# Patient Record
Sex: FEMALE | Race: WHITE | NOT HISPANIC OR LATINO | URBAN - METROPOLITAN AREA
[De-identification: names, ages, dates, MRNs, and addresses within clinical notes are randomized per-mention and may not be internally consistent; named-entity substitution may affect disease eponyms.]

---

## 2024-05-21 ENCOUNTER — INPATIENT (INPATIENT)
Facility: HOSPITAL | Age: 59
LOS: 2 days | Discharge: HOME | DRG: 92 | End: 2024-05-24
Attending: HOSPITALIST | Admitting: HOSPITALIST
Payer: SELF-PAY

## 2024-05-21 VITALS
WEIGHT: 139.99 LBS | SYSTOLIC BLOOD PRESSURE: 166 MMHG | HEART RATE: 105 BPM | OXYGEN SATURATION: 99 % | DIASTOLIC BLOOD PRESSURE: 76 MMHG | HEIGHT: 68 IN | RESPIRATION RATE: 17 BRPM

## 2024-05-21 DIAGNOSIS — Z90.13 ACQUIRED ABSENCE OF BILATERAL BREASTS AND NIPPLES: Chronic | ICD-10-CM

## 2024-05-21 DIAGNOSIS — G93.40 ENCEPHALOPATHY, UNSPECIFIED: ICD-10-CM

## 2024-05-21 DIAGNOSIS — Z29.9 ENCOUNTER FOR PROPHYLACTIC MEASURES, UNSPECIFIED: ICD-10-CM

## 2024-05-21 DIAGNOSIS — R41.82 ALTERED MENTAL STATUS, UNSPECIFIED: ICD-10-CM

## 2024-05-21 DIAGNOSIS — Z98.1 ARTHRODESIS STATUS: Chronic | ICD-10-CM

## 2024-05-21 DIAGNOSIS — Z79.899 OTHER LONG TERM (CURRENT) DRUG THERAPY: ICD-10-CM

## 2024-05-21 LAB
ADD ON TEST-SPECIMEN IN LAB: SIGNIFICANT CHANGE UP
ALBUMIN SERPL ELPH-MCNC: 4.5 G/DL — SIGNIFICANT CHANGE UP (ref 3.3–5)
ALP SERPL-CCNC: 86 U/L — SIGNIFICANT CHANGE UP (ref 40–120)
ALT FLD-CCNC: 36 U/L — SIGNIFICANT CHANGE UP (ref 10–45)
AMPHET UR-MCNC: NEGATIVE — SIGNIFICANT CHANGE UP
ANION GAP SERPL CALC-SCNC: 15 MMOL/L — SIGNIFICANT CHANGE UP (ref 5–17)
APAP SERPL-MCNC: <15 UG/ML — SIGNIFICANT CHANGE UP (ref 10–30)
AST SERPL-CCNC: 42 U/L — HIGH (ref 10–40)
BARBITURATES UR SCN-MCNC: NEGATIVE — SIGNIFICANT CHANGE UP
BASOPHILS # BLD AUTO: 0.05 K/UL — SIGNIFICANT CHANGE UP (ref 0–0.2)
BASOPHILS NFR BLD AUTO: 0.8 % — SIGNIFICANT CHANGE UP (ref 0–2)
BENZODIAZ UR-MCNC: NEGATIVE — SIGNIFICANT CHANGE UP
BILIRUB SERPL-MCNC: 0.4 MG/DL — SIGNIFICANT CHANGE UP (ref 0.2–1.2)
BUN SERPL-MCNC: 21 MG/DL — SIGNIFICANT CHANGE UP (ref 7–23)
CALCIUM SERPL-MCNC: 9.7 MG/DL — SIGNIFICANT CHANGE UP (ref 8.4–10.5)
CHLORIDE SERPL-SCNC: 102 MMOL/L — SIGNIFICANT CHANGE UP (ref 96–108)
CO2 SERPL-SCNC: 22 MMOL/L — SIGNIFICANT CHANGE UP (ref 22–31)
COCAINE METAB.OTHER UR-MCNC: NEGATIVE — SIGNIFICANT CHANGE UP
CREAT SERPL-MCNC: 0.71 MG/DL — SIGNIFICANT CHANGE UP (ref 0.5–1.3)
EGFR: 99 ML/MIN/1.73M2 — SIGNIFICANT CHANGE UP
EOSINOPHIL # BLD AUTO: 0.08 K/UL — SIGNIFICANT CHANGE UP (ref 0–0.5)
EOSINOPHIL NFR BLD AUTO: 1.2 % — SIGNIFICANT CHANGE UP (ref 0–6)
ETHANOL SERPL-MCNC: <10 MG/DL — SIGNIFICANT CHANGE UP (ref 0–10)
FLUAV AG NPH QL: SIGNIFICANT CHANGE UP
FLUBV AG NPH QL: SIGNIFICANT CHANGE UP
GLUCOSE SERPL-MCNC: 114 MG/DL — HIGH (ref 70–99)
HCG SERPL-ACNC: <2 MIU/ML — SIGNIFICANT CHANGE UP
HCT VFR BLD CALC: 40.9 % — SIGNIFICANT CHANGE UP (ref 34.5–45)
HGB BLD-MCNC: 14 G/DL — SIGNIFICANT CHANGE UP (ref 11.5–15.5)
IMM GRANULOCYTES NFR BLD AUTO: 0.3 % — SIGNIFICANT CHANGE UP (ref 0–0.9)
LYMPHOCYTES # BLD AUTO: 1.39 K/UL — SIGNIFICANT CHANGE UP (ref 1–3.3)
LYMPHOCYTES # BLD AUTO: 21.2 % — SIGNIFICANT CHANGE UP (ref 13–44)
MCHC RBC-ENTMCNC: 30.9 PG — SIGNIFICANT CHANGE UP (ref 27–34)
MCHC RBC-ENTMCNC: 34.2 GM/DL — SIGNIFICANT CHANGE UP (ref 32–36)
MCV RBC AUTO: 90.3 FL — SIGNIFICANT CHANGE UP (ref 80–100)
METHADONE UR-MCNC: NEGATIVE — SIGNIFICANT CHANGE UP
MONOCYTES # BLD AUTO: 0.88 K/UL — SIGNIFICANT CHANGE UP (ref 0–0.9)
MONOCYTES NFR BLD AUTO: 13.4 % — SIGNIFICANT CHANGE UP (ref 2–14)
NEUTROPHILS # BLD AUTO: 4.13 K/UL — SIGNIFICANT CHANGE UP (ref 1.8–7.4)
NEUTROPHILS NFR BLD AUTO: 63.1 % — SIGNIFICANT CHANGE UP (ref 43–77)
NRBC # BLD: 0 /100 WBCS — SIGNIFICANT CHANGE UP (ref 0–0)
OPIATES UR-MCNC: POSITIVE
OXYCODONE UR-MCNC: NEGATIVE — SIGNIFICANT CHANGE UP
PCP SPEC-MCNC: SIGNIFICANT CHANGE UP
PCP UR-MCNC: NEGATIVE — SIGNIFICANT CHANGE UP
PLATELET # BLD AUTO: 286 K/UL — SIGNIFICANT CHANGE UP (ref 150–400)
POTASSIUM SERPL-MCNC: 3.5 MMOL/L — SIGNIFICANT CHANGE UP (ref 3.5–5.3)
POTASSIUM SERPL-SCNC: 3.5 MMOL/L — SIGNIFICANT CHANGE UP (ref 3.5–5.3)
PROT SERPL-MCNC: 7.4 G/DL — SIGNIFICANT CHANGE UP (ref 6–8.3)
RBC # BLD: 4.53 M/UL — SIGNIFICANT CHANGE UP (ref 3.8–5.2)
RBC # FLD: 12.4 % — SIGNIFICANT CHANGE UP (ref 10.3–14.5)
RSV RNA NPH QL NAA+NON-PROBE: SIGNIFICANT CHANGE UP
SALICYLATES SERPL-MCNC: <2 MG/DL — LOW (ref 15–30)
SARS-COV-2 RNA SPEC QL NAA+PROBE: SIGNIFICANT CHANGE UP
SODIUM SERPL-SCNC: 139 MMOL/L — SIGNIFICANT CHANGE UP (ref 135–145)
THC UR QL: NEGATIVE — SIGNIFICANT CHANGE UP
WBC # BLD: 6.55 K/UL — SIGNIFICANT CHANGE UP (ref 3.8–10.5)
WBC # FLD AUTO: 6.55 K/UL — SIGNIFICANT CHANGE UP (ref 3.8–10.5)

## 2024-05-21 PROCEDURE — 99285 EMERGENCY DEPT VISIT HI MDM: CPT

## 2024-05-21 PROCEDURE — 70450 CT HEAD/BRAIN W/O DYE: CPT | Mod: 26,MC

## 2024-05-21 PROCEDURE — 72125 CT NECK SPINE W/O DYE: CPT | Mod: 26,MC

## 2024-05-21 PROCEDURE — 99451 NTRPROF PH1/NTRNET/EHR 5/>: CPT

## 2024-05-21 PROCEDURE — 99223 1ST HOSP IP/OBS HIGH 75: CPT | Mod: GC

## 2024-05-21 RX ORDER — HEPARIN SODIUM 5000 [USP'U]/ML
5000 INJECTION INTRAVENOUS; SUBCUTANEOUS EVERY 12 HOURS
Refills: 0 | Status: DISCONTINUED | OUTPATIENT
Start: 2024-05-21 | End: 2024-05-24

## 2024-05-21 RX ORDER — THIAMINE MONONITRATE (VIT B1) 100 MG
500 TABLET ORAL THREE TIMES A DAY
Refills: 0 | Status: DISCONTINUED | OUTPATIENT
Start: 2024-05-21 | End: 2024-05-22

## 2024-05-21 RX ORDER — FOLIC ACID 0.8 MG
1 TABLET ORAL DAILY
Refills: 0 | Status: DISCONTINUED | OUTPATIENT
Start: 2024-05-21 | End: 2024-05-24

## 2024-05-21 RX ORDER — SODIUM CHLORIDE 9 MG/ML
1000 INJECTION, SOLUTION INTRAVENOUS ONCE
Refills: 0 | Status: COMPLETED | OUTPATIENT
Start: 2024-05-21 | End: 2024-05-21

## 2024-05-21 RX ORDER — DIAZEPAM 5 MG
5 TABLET ORAL ONCE
Refills: 0 | Status: DISCONTINUED | OUTPATIENT
Start: 2024-05-21 | End: 2024-05-21

## 2024-05-21 RX ADMIN — Medication 5 MILLIGRAM(S): at 14:35

## 2024-05-21 RX ADMIN — Medication 2 MILLIGRAM(S): at 23:52

## 2024-05-21 RX ADMIN — SODIUM CHLORIDE 1000 MILLILITER(S): 9 INJECTION, SOLUTION INTRAVENOUS at 23:47

## 2024-05-21 NOTE — CONSULT NOTE ADULT - PROBLEM SELECTOR RECOMMENDATION 9
Emergency Medicine / Medical Toxicology Attending MD Gonsalves:    58-year-old female who presented to the ED with altered mental status of unclear etiology.  Medical history significant for breast cancer, lumbar stenosis, GERD.  Similar admission in 2022 for altered mental status with potential hallucinations.  Patient has a medical history of binge drinking, last EtOH 48 hours ago, EtOH at the time of ER presentation undetectable.    Patient is afebrile, no leukocytosis, complete chemistry and metabolic unremarkable, urine drug screen is only positive for opiates, CT head and C-spine unremarkable, twelve-lead ECG directly visualized by me and shows normal sinus rhythm, rate 96, , QRS 90, QTc 492, no ST elevations or depressions.    Patient has required periodic medication for agitation, with lorazepam and haloperidol boluses.    Recommendations:  -Agree with thiamine 500 mg 3 times daily as this patient's altered mental status may be Warnicke encephalopathy.  -If patient's altered mental status is due to polypharmacy intoxication then her altered mental status should improve over the next 12 to 36 hours.  -Patient is at risk for alcohol withdrawal given her recent 15-day binge, and agree with [facility] protocol.  -Continue to search for alternative etiology of patient's altered mental status.

## 2024-05-21 NOTE — ED PROVIDER NOTE - CLINICAL SUMMARY MEDICAL DECISION MAKING FREE TEXT BOX
Patient presents emergency department with altered mental status.  Patient is hemodynamically stable afebrile on presentation.  Patient physical exam limited due to altered mental status.  Patient is hallucinating and talking to herself.  Collateral obtained from son at this time there is no acute concern for alcohol withdrawal however given patient's presentation and hallucinations MICU has been consulted for DTs.    Evaluated patient's chart in different MRN which shows that the patient had a similar presentation to [facility] emergency department in the past.  No diagnosis was made at that time.  Will obtain lab work and imaging to evaluate for acute pathologies..

## 2024-05-21 NOTE — H&P ADULT - NSHPSOCIALHISTORY_GEN_ALL_CORE
Per son Asher, lives with 2 sons, though they are intermittently out of the house. Works medical billing for ex-. Former smoker, smoked a lot from [age]

## 2024-05-21 NOTE — H&P ADULT - ATTENDING COMMENTS
generalized weakness and nausea 58F with PMH R breast cancer (s/p double mastectomy (2017), RT) lumbar stenosis s/p L2-3 fusion, GERD, and glaucoma presenting after being found down outside with altered mental status,     # Acute toxic metabolic encephalopathy - Pt currently appears to be responding to internal stimuli (hallucinations?) Pt's responses are nonsensical and unable to provide history though she seems to know her name and location. pt has been binge drinking recently and her current symptoms may be possible  sequelae. Also possible there is some other toxic ingestion or a primary psychiatric illness. She has had a previous episode where she quickly returned to baseline however she continues to be altered at this time. No significant electrolyte abnormalities. Utox significant for opiates, neg for oxycodone. F/u toxicology recs. Psych evaluation.    # Alcohol abuse - recent binge drinking and alcohol abuse. Will monitor for alcohol withdrawal symptoms. Start CIWA, symptom triggered ativan. Ordered MV, folate, thiamine.     Rest of plan as outline above. Discussed case with resident 58F with PMH R breast cancer (s/p double mastectomy (2017), RT) lumbar stenosis s/p L2-3 fusion, GERD, and glaucoma presenting after being found down outside with altered mental status,     # Acute toxic metabolic encephalopathy - Pt currently appears to be responding to internal stimuli (hallucinations?) Pt's responses are nonsensical and unable to provide history though she seems to know her name and location. pt has been binge drinking recently and her current symptoms may be possible  sequelae. Also possible there is some other toxic ingestion or a primary psychiatric illness. She has had a previous episode where she quickly returned to baseline however she continues to be altered/disorganized at this time. No significant electrolyte abnormalities. Utox significant for opiates, neg for oxycodone. F/u toxicology recs. Psych evaluation.    # Alcohol abuse - recent binge drinking and alcohol abuse. Will monitor for alcohol withdrawal symptoms. Start CIWA, symptom triggered ativan. Ordered MV, folate, thiamine.     Rest of plan as outline above. Discussed case with resident

## 2024-05-21 NOTE — H&P ADULT - NSHPPHYSICALEXAM_GEN_ALL_CORE
VITALS:   T(C): 36.6 (05-21-24 @ 16:48), Max: 36.6 (05-21-24 @ 16:48)  HR: 90 (05-21-24 @ 16:48) (90 - 105)  BP: 164/93 (05-21-24 @ 16:48) (164/93 - 166/76)  RR: 16 (05-21-24 @ 16:48) (16 - 17)  SpO2: 100% (05-21-24 @ 16:48) (99% - 100%)    GENERAL: NAD, responding to internal stimuli  HEAD: Atraumatic, Normocephalic  EYES: EOMI, PERRLA, conjunctiva and sclera clear  ENT: Moist mucous membranes  NECK: Supple, No JVD  CHEST/LUNG: Clear to auscultation bilaterally; no crackles or wheezing  HEART: Regular rate and rhythm; No murmurs.  Peripheral edema:none  ABDOMEN: Soft, nontender, nondistended  EXTREMITIES:  2+ radial pulses  NERVOUS SYSTEM: Unable to assess AO status, no gross lateralizing deficits   SKIN: No rashes or lesions VITALS:   T(C): 36.6 (05-21-24 @ 16:48), Max: 36.6 (05-21-24 @ 16:48)  HR: 90 (05-21-24 @ 16:48) (90 - 105)  BP: 164/93 (05-21-24 @ 16:48) (164/93 - 166/76)  RR: 16 (05-21-24 @ 16:48) (16 - 17)  SpO2: 100% (05-21-24 @ 16:48) (99% - 100%)    GENERAL: NAD, responding to internal stimuli  HEAD: Atraumatic, Normocephalic  EYES: EOMI, PERRLA, conjunctiva and sclera clear  ENT: Moist mucous membranes  NECK: Supple, No JVD  CHEST/LUNG: Clear to auscultation bilaterally; no crackles or wheezing  HEART: Regular rate and rhythm; No murmurs.  Peripheral edema: none  ABDOMEN: Soft, nontender, nondistended  EXTREMITIES:  2+ radial pulses  NERVOUS SYSTEM: Unable to assess AO status, no gross lateralizing deficits   SKIN: No rashes or lesions

## 2024-05-21 NOTE — ED PROVIDER NOTE - PROGRESS NOTE DETAILS
Had extensive conversation with Grabiel who is the patient's son and healthcare proxy.  According to the son the patient has been on a binge of alcohol for the last 15 days.  Son and other brother have been in the house for the last 48 hours and states that the patient has not had a drink in the last 48 hours.  However at around 9 AM the patient ran out of the house unbeknownst to sons.  They were unable to find her and are not sure if she had anything to drink since then.  Denies any alcohol use or drug use in the last 48 hours according to the sons.  States that the patient has been going through a lot of stressors at home. Patient signed out to me by the prior attending.  The patient's disposition was discussed with the treating team and agreed upon.  Escobar Crandall M.D. (attending) Patient with history of substance abuse, psych hx, patient found face down in a baseball field today, patient is reportedly wacky chatting with her self today in monologue, presumed delirious talking to self, patient reportedly binge drank for 2 weeks and now 'dry for 72'. Patient without signs of motor excitation or fasciculations, valium 5mg given here by previous team. Spoke with psych who will come see the patient.

## 2024-05-21 NOTE — CONSULT NOTE ADULT - ATTENDING COMMENTS
Agree with above  57F PMH breast ca (s/p double mastectomy s/p RT) found down with AH/VH with possible binge drinking. MICU consult for possible DTs / etOH withdrawal.  - UTox negative for etOH, rest of tox panel pending.  - Patient is awake and responsive, but completely nonsensical on exam, in a way that almost appears to be expressive aphasia. Pupils are dilated. ?sympathomimetic ?anticholinergic toxidrome.  - Saturating well, protecting airway, hemodynamically stable  - No need for ICU level of care at this time. Please feel free to re-consult if patient's condition worsens.

## 2024-05-21 NOTE — CONSULT NOTE ADULT - ASSESSMENT
Toxicologic Context  Ethanol and sedative hypnotic withdrawal can present similarly with autonomic stimulation (tachycardia, hypertension, hyperthermia) and neuromuscular excitation (diaphoresis, tremors, rigidity, hypertonia, seizures, hallucinations), and psychological changes (insomnia, anxiety, agitation, emotional lability). Withdrawal can occur as early as 6 hours since cessation of substance. Other toxicological differentials to consider include sympathomimetic toxicity, serotonin toxicity, neuroleptic malignant syndrome, alpha-2-agonist withdrawal, and malginant hyperthermia.    Recommendations  - Supportive care, continue to monitor for development of alcohol withdrawal as patient is at high risk given recent 15 day binge   - Initiate CIWA protocol  - Benzodiazepines as needed for agitation, delirium, or seizures  - Further medical and/or psychiatric care per primary team    Thank you for involving us in the care of this patient. Assessment and plan discussed with toxicology attending Dr. José Luis Gonsalves. Please do not hesitate to reach out to the toxicology team for any further questions or concerns.    The On-Call Toxicology Fellow can be reached 24/7 via Pager #[phone]  Please send a 10 digit call back # as [place] cover multiple hospitals    Luisito Patel MD  Toxicology Fellow  PGY-4

## 2024-05-21 NOTE — ED ADULT NURSE NOTE - NSFALLRISKINTERV_ED_ALL_ED
Assistance OOB with selected safe patient handling equipment if applicable/Assistance with ambulation/Communicate fall risk and risk factors to all staff, patient, and family/Monitor gait and stability/Monitor for mental status changes and reorient to person, place, and time, as needed/Provide visual cue: yellow wristband, yellow gown, etc/Reinforce activity limits and safety measures with patient and family/Toileting schedule using arm’s reach rule for commode and bathroom/Use of alarms - bed, stretcher, chair and/or video monitoring/Call bell, personal items and telephone in reach/Instruct patient to call for assistance before getting out of bed/chair/stretcher/Non-slip footwear applied when patient is off stretcher/[place] to call system/Physically safe environment - no spills, clutter or unnecessary equipment/Purposeful Proactive Rounding/Room/bathroom lighting operational, light cord in reach

## 2024-05-21 NOTE — ED ADULT NURSE NOTE - OBJECTIVE STATEMENT
58 year old female pt presented to the ED via ems with pt being found in the street on the ground, pt presented to the ED not following commands and speaking to herself( hallucinations), MD spoke with family with son stating pt has been drinking alcohol binging and 2 days of not drinking, pt in hospital gown with no obvious signs of trauma pt moving all extremities, pt speaking to self not following commands, unable to perform CIWA at this time

## 2024-05-21 NOTE — H&P ADULT - ASSESSMENT
57F with PMH R breast cancer (s/p double mastectomy (2017), RT) lumbar stenosis s/p L2-3 fusion, GERD, and glaucoma brought in after being found down outside with reported visual/auditory hallucinations in the ED in setting of recent binge drinking. Found to have + opiates in urine. Admitted for encephalopathy evaluation.  58F with PMH R breast cancer (s/p double mastectomy (2017), RT) lumbar stenosis s/p L2-3 fusion, GERD, and glaucoma brought in after being found down outside with reported visual/auditory hallucinations in the ED in setting of recent binge drinking. Found to have + opiates in urine. Admitted for encephalopathy evaluation.

## 2024-05-21 NOTE — CONSULT NOTE ADULT - SUBJECTIVE AND OBJECTIVE BOX
CHIEF COMPLAINT: hallucinations     HPI:  57F with PMH R breast cancer (s/p double mastectomy (2017), RT) lumbar stenosis s/p L2-3 fusion, GERD, and glaucoma brought in after being found down outside near a school. Per ED, son Grabiel was previously at bedside and states that pt had been binge drinking for past 15 days. Last known drink was 48 hrs ago. However, around 9am today pt ran out of the house and found around 1pm. Without any known substance use disorder, prior hospitalizations for alcohol use.     In ED, VS afebrile, /76, , Spo2 99% on RA. Was reportedly actively hallucinating, talking to thin air.  Labs largely unrevealing. Negative alcohol level. Drug screen pending.   CT head and c-spine without acute findings   Received diazepam 5mg IVPx1 in ED.     MICU was consulted for concerns for delirium tremens.     No one at bedside upon evaluation. Patient nonsensical/not engaging in interview. Upon review of HIE, other charts available under [facility]. Pt had an admission in 2022 for similar hallucinations/AMS for which  was following. Initial workup at that time was unrevealing and pt had quickly returned to baseline.      REVIEW OF SYSTEMS: could not obtain due to mental status    PAST MEDICAL & SURGICAL HISTORY:  - Breast cancer     - s/p double mastectomy    FAMILY HISTORY: could not obtian       SOCIAL HISTORY: Binge drinking for past 2 weeks. No prior h/o alcohol withdrawal/hospitalizations. No other known substance use to son     Allergies: No Known Allergies/Intolerances      HOME MEDICATIONS:      OBJECTIVE:  ICU Vital Signs Last 24 Hrs  T(C): --  T(F): --  HR: 105 (21 May 2024 13:21) (105 - 105)  BP: 166/76 (21 May 2024 13:21) (166/76 - 166/76)  BP(mean): --  ABP: --  ABP(mean): --  RR: 17 (21 May 2024 13:21) (17 - 17)  SpO2: 99% (21 May 2024 13:21) (99% - 99%)    O2 Parameters below as of 21 May 2024 13:21  Patient On (Oxygen Delivery Method): room air      CAPILLARY BLOOD GLUCOSE    POCT Blood Glucose.: 120 mg/dL (21 May 2024 13:41)    PHYSICAL EXAM:  General: NAD, non-toxic appearing  HEENT: L>R pupils, 5-6mm, no scleral icterus  CV: RRR, normal S1 and S2  Lungs: normal respiratory effort, CTAB  Abd: soft, nontender, nondistended  Ext: no edema, warm, well perfused  Pysch: awake, calm, appropriate affect, nonsensical speech, not answering questions appropriately, following some commands   Neuro: grossly non-focal, moving all extremities spontaneously   Skin: no rashes or lesions     HOSPITAL MEDICATIONS:  MEDICATIONS  (STANDING):    MEDICATIONS  (PRN):      LABS:                        14.0   6.55  )-----------( 286      ( 21 May 2024 14:15 )             40.9     05-21    139  |  102  |  21  ----------------------------<  114<H>  3.5   |  22  |  0.71    Ca    9.7      21 May 2024 14:15    TPro  7.4  /  Alb  4.5  /  TBili  0.4  /  DBili  x   /  AST  42<H>  /  ALT  36  /  AlkPhos  86  05-21      Urinalysis Basic - ( 21 May 2024 14:15 )    Color: x / Appearance: x / SG: x / pH: x  Gluc: 114 mg/dL / Ketone: x  / Bili: x / Urobili: x   Blood: x / Protein: x / Nitrite: x   Leuk Esterase: x / RBC: x / WBC x   Sq Epi: x / Non Sq Epi: x / Bacteria: x    MICROBIOLOGY:     RADIOLOGY:  [ ] Reviewed and interpreted by me    EKG:
MEDICAL TOXICOLOGY CONSULT    HPI:  58F with PMH R breast cancer (s/p double mastectomy (2017), RT) lumbar stenosis s/p L2-3 fusion, GERD, and glaucoma brought in after being found down outside near a school. Per ED, son Grabiel was previously at bedside and states that pt had been binge drinking for past 15 days. Last known drink was 48 hrs ago. However, around 9am today pt ran out of the house and found down in a baseball field around 1pm.    ED Course:  Vitals: Afebrile, /76, -->90, 100% RA RR 16  Labs: CBC, CMP unremarkable, uTOX + for opiates, blood alcohol negative, RVP negative. CT brain and cervical spine only with severe disc degenerative changes at C4-C5 and C6-C7. Only given diazepam 5mg IV x1 in the ED.   Evaluated by MICU for acute encephalopathy and delirium tremens, deemed not a candidate.    Other charts available under MRN [id number]. Pt had an admission in 2022 for similar hallucinations/AMS for which  was following. Initial workup at that time was unrevealing and pt had quickly returned to baseline. At that time pt had been not sleeping for 2-4 weeks. Pt saw Dr. Irineo Mcdaniel orthopedic surgery April 2024 for follow up of sciatica symptoms after her spinal fusion.    As per report, in the ED, the patient was noted to have dilated pupils with no evidence of tongue fasciculations, tremors, rigidity, or clonus. Alert and oriented to person and following some commands. EKG is NSR at 96 with QRS 90 msand QTc 492 ms.     PAST MEDICAL & SURGICAL HISTORY:  Breast cancer      Lumbar stenosis      Chronic GERD      Glaucoma      H/O bilateral mastectomy      S/P lumbar fusion      MEDICATION HISTORY:  folic acid 1 milliGRAM(s) Oral daily  heparin   Injectable 5000 Unit(s) SubCutaneous every 12 hours  LORazepam     Tablet 2 milliGRAM(s) Oral every 2 hours PRN  LORazepam     Tablet 2 milliGRAM(s) Oral every 1 hour PRN  multivitamin 1 Tablet(s) Oral daily  thiamine IVPB 500 milliGRAM(s) IV Intermittent three times a day      FAMILY HISTORY: Non contributory      REVIEW OF SYSTEMS:   As per ED provider      Vital Signs Last 24 Hrs  T(C): 36.5 (22 May 2024 03:35), Max: 37.5 (21 May 2024 23:02)  T(F): 97.7 (22 May 2024 03:35), Max: 99.5 (21 May 2024 23:02)  HR: 72 (22 May 2024 03:35) (72 - 105)  BP: 120/73 (22 May 2024 03:35) (120/73 - 166/76)  BP(mean): --  RR: 18 (22 May 2024 03:35) (16 - 18)  SpO2: 98% (22 May 2024 03:35) (96% - 100%)    Parameters below as of 22 May 2024 03:35  Patient On (Oxygen Delivery Method): room air        SIGNIFICANT LABORATORY STUDIES:                        13.6   3.91  )-----------( 256      ( 22 May 2024 06:55 )             40.5       05-22    141  |  105  |  14  ----------------------------<  84  3.2<L>   |  23  |  0.57    Ca    9.1      22 May 2024 06:50  Phos  3.7     05-22  Mg     2.2     05-22    TPro  6.7  /  Alb  4.2  /  TBili  0.6  /  DBili  x   /  AST  37  /  ALT  35  /  AlkPhos  83  05-22      PT/INR - ( 22 May 2024 06:55 )   PT: 10.8 sec;   INR: 0.98 ratio         PTT - ( 22 May 2024 06:55 )  PTT:28.0 sec    Gluc: 84 mg/dL   Anion Gap: 13 05-22 @ 06:50  Aspirin Level: <2.0<L>  05-21 @ 14:15  Acetaminophen Level:  <15  05-21 @ 14:15  Ethanol Level:  <10  05-21 @ 14:15

## 2024-05-21 NOTE — CHART NOTE - NSCHARTNOTEFT_GEN_A_CORE
iSTOP using both last names without recent prescriptions.    This report was requested by: Meir Hammond | Reference #: [id number]    There are no results for the search terms that you entered.    This report was requested by: Meir Hammond | Reference #: [id number]    There are no results for the search terms that you entered.

## 2024-05-21 NOTE — CONSULT NOTE ADULT - ASSESSMENT
57F with PMH R breast cancer (s/p double mastectomy (2017), RT) lumbar stenosis s/p L2-3 fusion, GERD, and glaucoma brought in after being found down outside with reported visual/auditory hallucinations in the ED in setting of recent binge drinking. MICU was consulted for concern for delirium tremens.     #acute encephalopathy  #hallcuinations  Patient not engaging/nonsensical on our exam, was reportedly with visual/auditory hallucinations while in ED. Neuro exam grossly nonfocal, dilated pupils noted with anisocoria (L>R), was able to follow simple commands, no tremors/asterixis, no tongue fasciulations, very calm/comfortable. Labs without electrolyte abnormalities. CTH without acute pathology.Overall, given story, found down outside hours later with  and clinical picture more concerning for potential acute intoxication/substance use, lower suspicion for acute alcohol withdrawal, inconsistent with DT's    Recommend:  - continue monitoring for signs of alcohol withdrawal given h/o binge drinking   - follow up urine tox   - infectious workup  - can consider brain MRI if remains altered/workup above negative  - consider BH consult if workup for organic causes unrevealing    Pt is otherwise protecting airway at this time and without any ICU needs. Not a MICU candidate. Discussed with attending, Dr. Araujo  57F with PMH R breast cancer (s/p double mastectomy (2017), RT) lumbar stenosis s/p L2-3 fusion, GERD, and glaucoma brought in after being found down outside with reported visual/auditory hallucinations in the ED in setting of recent binge drinking. MICU was consulted for concern for delirium tremens.     #acute encephalopathy  #hallcuinations  Patient not engaging/nonsensical on our exam, was reportedly with visual/auditory hallucinations while in ED. Neuro exam grossly nonfocal, dilated pupils noted with anisocoria (L>R), was able to follow simple commands, no tremors/asterixis, no tongue fasciulations, very calm/comfortable. Labs without electrolyte abnormalities. CTH without acute pathology.Overall, given story, found down outside hours later with  and clinical picture more concerning for potential acute intoxication/substance use, lower suspicion for acute alcohol withdrawal, inconsistent with DT's    Recommend:  - continue monitoring for signs of alcohol withdrawal given h/o binge drinking   - follow up urine tox   - infectious workup  - can consider brain MRI if remains altered/workup above negative  - consider BH consult if workup for organic causes unrevealing    Pt is otherwise protecting airway at this time and without any ICU needs. Not a MICU candidate.   Dara Trevizo, PGY3  Discussed with attending, Dr. Araujo

## 2024-05-21 NOTE — H&P ADULT - NSHPLABSRESULTS_GEN_ALL_CORE
LABS:                         14.0   6.55  )-----------( 286      ( 21 May 2024 14:15 )             40.9     05-21    139  |  102  |  21  ----------------------------<  114<H>  3.5   |  22  |  0.71    Ca    9.7      21 May 2024 14:15    TPro  7.4  /  Alb  4.5  /  TBili  0.4  /  DBili  x   /  AST  42<H>  /  ALT  36  /  AlkPhos  86  05-21      Urinalysis Basic - ( 21 May 2024 14:15 )    Color: x / Appearance: x / SG: x / pH: x  Gluc: 114 mg/dL / Ketone: x  / Bili: x / Urobili: x   Blood: x / Protein: x / Nitrite: x   Leuk Esterase: x / RBC: x / WBC x   Sq Epi: x / Non Sq Epi: x / Bacteria: x      RADIOLOGY, EKG & ADDITIONAL TESTS:  < from: CT Head No Cont (05.21.24 @ 15:46) >    CT HEAD:  No acute transcortical infarct or intracranial hemorrhage.    The ventricles are normal without evidence of hydrocephalus. There are no   extra-axial fluid collections.    The visualized intraorbital contents are unremarkable. Mucous retention   cyst in the right maxillary sinus. The mastoid air cells are clear. The   visualized soft tissues and osseous structures appear normal.    CT CERVICAL SPINE:  No acute fracture or acute subluxation.  Multilevel degenerative changes. Mild anterolisthesis of C7 on T1   secondary to facet joint arthrosis. Severe disc degenerative changes at   C4-C5and C6-C7. Limited evaluation of the spinal canal soft tissue   contents by CT.    There is no prevertebral or paraspinal soft tissue swelling.    Included lung apices are clear.    IMPRESSION:  CT head:  -No acute intracranial findings.    CT cervical spine:  -No acute fracture or dislocation.    < end of copied text >

## 2024-05-21 NOTE — H&P ADULT - PROBLEM SELECTOR PLAN 2
Diet:  - Aspiration precautions  DVT Proph:  Dispo: Diet: Regular  - Aspiration precautions  DVT Proph: heparin sub q  Dispo: Pending course - Pt and son unable to provide medication list  - Son also mentions pt had a severe allergy to an anesthetic in the past, but doest know what

## 2024-05-21 NOTE — ED PROVIDER NOTE - ATTENDING CONTRIBUTION TO CARE
Boris Kaye DO: I have personally performed a face to face medical and diagnostic evaluation of the patient. I have discussed with and reviewed the Resident's and/or ACP's and/or Medical/PA/NP student's note and agree with the History, ROS, Physical Exam and MDM unless otherwise indicated. A brief summary of my personal evaluation and impression can be found below.     Patient is a 58-year-old female with unknown past medical history presents emergency department brought in by EMS.  According to EMS she was found down in the baseball field of a nearby school.  Patient is hallucinating and talking to herself according to EMS.  Patient unable to provide history.  Collateral obtained from son medical history states that the patient has been on an alcohol bridge for the last 15 days.  Patient has not had any alcohol over the last 48 hours after the brother and the other son stepped in.  According to the family that the patient ran away from home around 9 AM and they were not sure of her whereabouts.  Denies any drug use according to family members.  Denies any recent illnesses such as fever, chills, chest pain, shortness of breath    CONSTITUTIONAL: nad afebrile  SKIN: Warm dry, no ecchymosis, dried dirt around face  HEAD: NCAT  EYES: EOMI, PERRLA, no scleral icterus, conjunctiva pink  ENT: normal pharynx with no erythema or exudates  NECK: Supple; non tender. Full ROM.  CARD: RRR, no murmurs.  RESP: clear to ausculation b/l. No crackles or wheezing.  ABD: soft, non-tender, non-distended, no rebound or guarding.  MSK: Full ROM, no bony tenderness, no pedal edema, no calf tenderness  NEURO: normal motor. normal sensory. no clonus, no hyperreflexia  PSYCH: patient talking to themselves making no sense, able to recognise presence of others but goes back to talking to self, not cooperating with exam    w/ hx of alcohol binge w/ <48h of no alcohol use concern for early onset delirium tremens although patient has normal vitals at this time, less likely meningitis or infectious encephalopathy given substance/alcohol hx, possibly drug/tox induced although no suspected source from family, less likley metabolic encephalopathy, will get labs including drug screen serum alcohol, give supportive benzo treatment, early consult MICU and toxicology consult, imaging of head to r/o ich, patient will need admission for altered mental status

## 2024-05-21 NOTE — H&P ADULT - HISTORY OF PRESENT ILLNESS
57F with PMH R breast cancer (s/p double mastectomy (2017), RT) lumbar stenosis s/p L2-3 fusion, GERD, and glaucoma brought in after being found down outside near a school. Per ED, son Grabiel was previously at bedside and states that pt had been binge drinking for past 15 days. Last known drink was 48 hrs ago. However, around 9am today pt ran out of the house and found around 1pm. Without any known substance use disorder, prior hospitalizations for alcohol use.    ED Course:  Vitals: Afebrile, /76, -->90, 100% RA RR 16  Labs: CBC, CMP unremarkable, uTOX + for opiates, blood alcohol negative, RVP negative. CT brain and cervical spine only with severe disc degenerative changes at C4-C5 and C6-C7. Only given diazepam 5mg IV x1 in the ED.   Evaluated by MICU for acute encephalopathy and delirium tremens, deemed not a candidate.    Pther charts available under Karen [facility]. Pt had an admission in 2022 for similar hallucinations/AMS for which  was following. Initial workup at that time was unrevealing and pt had quickly returned to baseline.   57F with PMH R breast cancer (s/p double mastectomy (2017), RT) lumbar stenosis s/p L2-3 fusion, GERD, and glaucoma brought in after being found down outside near a school. Per ED, son Grabiel was previously at bedside and states that pt had been binge drinking for past 15 days. Last known drink was 48 hrs ago. However, around 9am today pt ran out of the house and found around 1pm. Without any known substance use disorder, prior hospitalizations for alcohol use.    ED Course:  Vitals: Afebrile, /76, -->90, 100% RA RR 16  Labs: CBC, CMP unremarkable, uTOX + for opiates, blood alcohol negative, RVP negative. CT brain and cervical spine only with severe disc degenerative changes at C4-C5 and C6-C7. Only given diazepam 5mg IV x1 in the ED.   Evaluated by MICU for acute encephalopathy and delirium tremens, deemed not a candidate.    Other charts available under MRN [id number]. Pt had an admission in 2022 for similar hallucinations/AMS for which  was following. Initial workup at that time was unrevealing and pt had quickly returned to baseline. At that time pt had been not sleeping for 2-4 weeks. Pt saw Dr. Irineo Mcdaniel orthopedic surgery April 2024 for follow up of sciatica symptoms after her spinal fusion.  57F with PMH R breast cancer (s/p double mastectomy (2017), RT) lumbar stenosis s/p L2-3 fusion, GERD, and glaucoma brought in after being found down outside near a school. Per ED, son Grabiel was previously at bedside and states that pt had been binge drinking for past 15 days. Last known drink was 48 hrs ago. However, around 9am today pt ran out of the house and [place]. Without any known substance use disorder, prior hospitalizations for alcohol use.    ED Course:  Vitals: Afebrile, /76, -->90, 100% RA RR 16  Labs: CBC, CMP unremarkable, uTOX + for opiates, blood alcohol negative, RVP negative. CT brain and cervical spine only with severe disc degenerative changes at C4-C5 and C6-C7. Only given diazepam 5mg IV x1 in the ED.   Evaluated by MICU for acute encephalopathy and delirium tremens, deemed not a candidate.    Other charts available under MRN [id number]. Pt had an admission in 2022 for similar hallucinations/AMS for which  was following. Initial workup at that time was unrevealing and pt had quickly returned to baseline. At that time pt had been not sleeping for 2-4 weeks. Pt saw Dr. Irineo Mcdaniel orthopedic surgery April 2024 for follow up of sciatica symptoms after her spinal fusion.     On my exam in the ED, pt is comfortable and in no distress laying flat. She is responding to internal stimuli and having a monologue. Unable to participate in interview or in physical exam. 57F with PMH R breast cancer (s/p double mastectomy (2017), RT) lumbar stenosis s/p L2-3 fusion, GERD, and glaucoma brought in after being found down outside near a school. Per ED, son Grabiel was previously at bedside and states that pt had been binge drinking for past 15 days. Last known drink was 48 hrs ago. However, around 9am today pt ran out of the house and found down in a baseball field around 1pm. Without any known substance use disorder, prior hospitalizations for alcohol use.    ED Course:  Vitals: Afebrile, /76, -->90, 100% RA RR 16  Labs: CBC, CMP unremarkable, uTOX + for opiates, blood alcohol negative, RVP negative. CT brain and cervical spine only with severe disc degenerative changes at C4-C5 and C6-C7. Only given diazepam 5mg IV x1 in the ED.   Evaluated by MICU for acute encephalopathy and delirium tremens, deemed not a candidate.    Other charts available under MRN [id number]. Pt had an admission in 2022 for similar hallucinations/AMS for which  was following. Initial workup at that time was unrevealing and pt had quickly returned to baseline. At that time pt had been not sleeping for 2-4 weeks. Pt saw Dr. Irineo Mcdaniel orthopedic surgery April 2024 for follow up of sciatica symptoms after her spinal fusion.    On my exam in the ED, pt is comfortable and in no distress laying flat. She is responding to internal stimuli and having a monologue. Unable to participate in interview or in physical exam. Collateral from son Asher [phone] - pt was sober from december-march. Then pt's mother was admitted to hospital in march, and in the last month pt has been drinking a 1 bottle of wine nightly and for the last week 2 weeks nightly. Did not drink anything in the last 48 hours. Was paranoid, looking out blinds, thought people were going to kill her. States pt gets shakes when she stops drinking. Pt has not been sleeping well, and in last 48 hours has been obsessively cleaning. They do not have any pills post spinal surgery, though on one previous episode they found a pill bottle of opioids next to her that she denied taking. 58F with PMH R breast cancer (s/p double mastectomy (2017), RT) lumbar stenosis s/p L2-3 fusion, GERD, and glaucoma brought in after being found down outside near a school. Per ED, son Grabiel was previously at bedside and states that pt had been binge drinking for past 15 days. Last known drink was 48 hrs ago. However, around 9am today pt ran out of the house and found down in a baseball field around 1pm.    ED Course:  Vitals: Afebrile, /76, -->90, 100% RA RR 16  Labs: CBC, CMP unremarkable, uTOX + for opiates, blood alcohol negative, RVP negative. CT brain and cervical spine only with severe disc degenerative changes at C4-C5 and C6-C7. Only given diazepam 5mg IV x1 in the ED.   Evaluated by MICU for acute encephalopathy and delirium tremens, deemed not a candidate.    Other charts available under MRN [id number]. Pt had an admission in 2022 for similar hallucinations/AMS for which  was following. Initial workup at that time was unrevealing and pt had quickly returned to baseline. At that time pt had been not sleeping for 2-4 weeks. Pt saw Dr. Irineo Mcdaniel orthopedic surgery April 2024 for follow up of sciatica symptoms after her spinal fusion.    On my exam in the ED, pt is comfortable and in no distress laying flat. She is responding to internal stimuli and having a monologue. Unable to participate in interview or in physical exam. Collateral from son Ahser [phone] - pt was sober from december-march. Then pt's mother was admitted to hospital in march, and in the last month pt has been drinking a 1 bottle of wine nightly and for the last week 2 weeks 2 bottles nightly. Did not drink anything in the last 48 hours. Was paranoid, looking out blinds, thought people were going to kill her. States pt gets shakes when she stops drinking. Pt has not been sleeping well, and in last 48 hours has been obsessively cleaning. They do not have any pills post spinal surgery, though on one previous episode they found a pill bottle of opioids next to her that she denied taking.

## 2024-05-21 NOTE — ED PROVIDER NOTE - OBJECTIVE STATEMENT
Patient is a 58-year-old female with unknown past medical history presents emergency department brought in by EMS.  According to EMS she [place] [facility].  Patient is hallucinating and talking to herself according to EMS.  Patient unable to provide history.  Collateral obtained from son medical history states that the patient has been on an alcohol bridge for the last 15 days.  Patient has not had any alcohol over the last 48 hours after the brother and the other son stepped in.  According to the family that the patient ran away from home around 9 AM and they were not sure of her whereabouts.  Denies any drug use according to family members.  Denies any recent illnesses such as fever, chills, chest pain, shortness of breath

## 2024-05-21 NOTE — H&P ADULT - PROBLEM SELECTOR PLAN 1
- Unclear etiology of acute encephalopathy, though likely related to drug intoxication  - CT head unremarkable  - uTOX + for opiates but negative for oxycodone. Check urine fentanyl  - Check TSH, HIV  - Given binge drinking, will place on symptom triggered CIWA  - Consider MRI brain if no improvement. Would need to confirm with Dr. Mcdaniel from ortho surg if can get MRI with spinal hardware - Unclear etiology of acute encephalopathy, though likely related to drug intoxication. Preceding events may have been manic episode?  - CT head unremarkable  - uTOX + for opiates but negative for oxycodone. Check urine fentanyl  - Check TSH, HIV, UA  - Given binge drinking, will place on symptom triggered CIWA  - Psych consult  - Consider MRI brain if no improvement. Would need to confirm with Dr. Mcdaniel from ortho surg if can get MRI with spinal hardware - Unclear etiology of acute encephalopathy, though likely related to drug intoxication. Preceding events may have been manic episode?  - CT head unremarkable  - uTOX + for opiates but negative for oxycodone. Check urine fentanyl  - Check TSH, HIV, UA  - Given binge drinking, will place on symptom triggered CIWA  - Psych consult in am when more alert  - Consider MRI brain if no improvement. Would need to confirm with Dr. Mcdaniel from ortho surg if can get MRI with spinal hardware

## 2024-05-22 DIAGNOSIS — R33.9 RETENTION OF URINE, UNSPECIFIED: ICD-10-CM

## 2024-05-22 DIAGNOSIS — F10.20 ALCOHOL DEPENDENCE, UNCOMPLICATED: ICD-10-CM

## 2024-05-22 LAB
ALBUMIN SERPL ELPH-MCNC: 4.2 G/DL — SIGNIFICANT CHANGE UP (ref 3.3–5)
ALP SERPL-CCNC: 83 U/L — SIGNIFICANT CHANGE UP (ref 40–120)
ALT FLD-CCNC: 35 U/L — SIGNIFICANT CHANGE UP (ref 10–45)
ANION GAP SERPL CALC-SCNC: 13 MMOL/L — SIGNIFICANT CHANGE UP (ref 5–17)
APTT BLD: 28 SEC — SIGNIFICANT CHANGE UP (ref 24.5–35.6)
AST SERPL-CCNC: 37 U/L — SIGNIFICANT CHANGE UP (ref 10–40)
BASOPHILS # BLD AUTO: 0.03 K/UL — SIGNIFICANT CHANGE UP (ref 0–0.2)
BASOPHILS NFR BLD AUTO: 0.8 % — SIGNIFICANT CHANGE UP (ref 0–2)
BILIRUB SERPL-MCNC: 0.6 MG/DL — SIGNIFICANT CHANGE UP (ref 0.2–1.2)
BLD GP AB SCN SERPL QL: NEGATIVE — SIGNIFICANT CHANGE UP
BUN SERPL-MCNC: 14 MG/DL — SIGNIFICANT CHANGE UP (ref 7–23)
CALCIUM SERPL-MCNC: 9.1 MG/DL — SIGNIFICANT CHANGE UP (ref 8.4–10.5)
CHLORIDE SERPL-SCNC: 105 MMOL/L — SIGNIFICANT CHANGE UP (ref 96–108)
CO2 SERPL-SCNC: 23 MMOL/L — SIGNIFICANT CHANGE UP (ref 22–31)
CREAT SERPL-MCNC: 0.57 MG/DL — SIGNIFICANT CHANGE UP (ref 0.5–1.3)
EGFR: 106 ML/MIN/1.73M2 — SIGNIFICANT CHANGE UP
EOSINOPHIL # BLD AUTO: 0.18 K/UL — SIGNIFICANT CHANGE UP (ref 0–0.5)
EOSINOPHIL NFR BLD AUTO: 4.6 % — SIGNIFICANT CHANGE UP (ref 0–6)
GLUCOSE SERPL-MCNC: 84 MG/DL — SIGNIFICANT CHANGE UP (ref 70–99)
HCT VFR BLD CALC: 40.5 % — SIGNIFICANT CHANGE UP (ref 34.5–45)
HGB BLD-MCNC: 13.6 G/DL — SIGNIFICANT CHANGE UP (ref 11.5–15.5)
HIV 1+2 AB+HIV1 P24 AG SERPL QL IA: SIGNIFICANT CHANGE UP
IMM GRANULOCYTES NFR BLD AUTO: 0.3 % — SIGNIFICANT CHANGE UP (ref 0–0.9)
INR BLD: 0.98 RATIO — SIGNIFICANT CHANGE UP (ref 0.85–1.18)
LYMPHOCYTES # BLD AUTO: 1.43 K/UL — SIGNIFICANT CHANGE UP (ref 1–3.3)
LYMPHOCYTES # BLD AUTO: 36.6 % — SIGNIFICANT CHANGE UP (ref 13–44)
MAGNESIUM SERPL-MCNC: 2.2 MG/DL — SIGNIFICANT CHANGE UP (ref 1.6–2.6)
MCHC RBC-ENTMCNC: 31.3 PG — SIGNIFICANT CHANGE UP (ref 27–34)
MCHC RBC-ENTMCNC: 33.6 GM/DL — SIGNIFICANT CHANGE UP (ref 32–36)
MCV RBC AUTO: 93.1 FL — SIGNIFICANT CHANGE UP (ref 80–100)
MONOCYTES # BLD AUTO: 0.5 K/UL — SIGNIFICANT CHANGE UP (ref 0–0.9)
MONOCYTES NFR BLD AUTO: 12.8 % — SIGNIFICANT CHANGE UP (ref 2–14)
NEUTROPHILS # BLD AUTO: 1.76 K/UL — LOW (ref 1.8–7.4)
NEUTROPHILS NFR BLD AUTO: 44.9 % — SIGNIFICANT CHANGE UP (ref 43–77)
NRBC # BLD: 0 /100 WBCS — SIGNIFICANT CHANGE UP (ref 0–0)
PHOSPHATE SERPL-MCNC: 3.7 MG/DL — SIGNIFICANT CHANGE UP (ref 2.5–4.5)
PLATELET # BLD AUTO: 256 K/UL — SIGNIFICANT CHANGE UP (ref 150–400)
POTASSIUM SERPL-MCNC: 3.2 MMOL/L — LOW (ref 3.5–5.3)
POTASSIUM SERPL-SCNC: 3.2 MMOL/L — LOW (ref 3.5–5.3)
PROT SERPL-MCNC: 6.7 G/DL — SIGNIFICANT CHANGE UP (ref 6–8.3)
PROTHROM AB SERPL-ACNC: 10.8 SEC — SIGNIFICANT CHANGE UP (ref 9.5–13)
RBC # BLD: 4.35 M/UL — SIGNIFICANT CHANGE UP (ref 3.8–5.2)
RBC # FLD: 12.5 % — SIGNIFICANT CHANGE UP (ref 10.3–14.5)
RH IG SCN BLD-IMP: POSITIVE — SIGNIFICANT CHANGE UP
SODIUM SERPL-SCNC: 141 MMOL/L — SIGNIFICANT CHANGE UP (ref 135–145)
TSH SERPL-MCNC: 2.06 UIU/ML — SIGNIFICANT CHANGE UP (ref 0.27–4.2)
WBC # BLD: 3.91 K/UL — SIGNIFICANT CHANGE UP (ref 3.8–10.5)
WBC # FLD AUTO: 3.91 K/UL — SIGNIFICANT CHANGE UP (ref 3.8–10.5)

## 2024-05-22 PROCEDURE — 99233 SBSQ HOSP IP/OBS HIGH 50: CPT | Mod: GC

## 2024-05-22 PROCEDURE — 99221 1ST HOSP IP/OBS SF/LOW 40: CPT

## 2024-05-22 RX ORDER — POTASSIUM CHLORIDE 20 MEQ
20 PACKET (EA) ORAL
Refills: 0 | Status: COMPLETED | OUTPATIENT
Start: 2024-05-22 | End: 2024-05-22

## 2024-05-22 RX ORDER — HALOPERIDOL DECANOATE 100 MG/ML
1 INJECTION INTRAMUSCULAR THREE TIMES A DAY
Refills: 0 | Status: DISCONTINUED | OUTPATIENT
Start: 2024-05-22 | End: 2024-05-22

## 2024-05-22 RX ORDER — HALOPERIDOL DECANOATE 100 MG/ML
1 INJECTION INTRAMUSCULAR THREE TIMES A DAY
Refills: 0 | Status: DISCONTINUED | OUTPATIENT
Start: 2024-05-22 | End: 2024-05-24

## 2024-05-22 RX ORDER — CHLORHEXIDINE GLUCONATE 213 G/1000ML
1 SOLUTION TOPICAL DAILY
Refills: 0 | Status: DISCONTINUED | OUTPATIENT
Start: 2024-05-22 | End: 2024-05-24

## 2024-05-22 RX ORDER — THIAMINE MONONITRATE (VIT B1) 100 MG
500 TABLET ORAL THREE TIMES A DAY
Refills: 0 | Status: DISCONTINUED | OUTPATIENT
Start: 2024-05-22 | End: 2024-05-24

## 2024-05-22 RX ADMIN — Medication 105 MILLIGRAM(S): at 13:29

## 2024-05-22 RX ADMIN — Medication 20 MILLIEQUIVALENT(S): at 11:39

## 2024-05-22 RX ADMIN — HEPARIN SODIUM 5000 UNIT(S): 5000 INJECTION INTRAVENOUS; SUBCUTANEOUS at 05:58

## 2024-05-22 RX ADMIN — Medication 105 MILLIGRAM(S): at 05:58

## 2024-05-22 RX ADMIN — Medication 20 MILLIEQUIVALENT(S): at 10:05

## 2024-05-22 RX ADMIN — HEPARIN SODIUM 5000 UNIT(S): 5000 INJECTION INTRAVENOUS; SUBCUTANEOUS at 18:18

## 2024-05-22 RX ADMIN — HALOPERIDOL DECANOATE 1 MILLIGRAM(S): 100 INJECTION INTRAMUSCULAR at 18:55

## 2024-05-22 RX ADMIN — Medication 105 MILLIGRAM(S): at 22:04

## 2024-05-22 RX ADMIN — Medication 0.5 MILLIGRAM(S): at 18:55

## 2024-05-22 RX ADMIN — Medication 1 TABLET(S): at 11:39

## 2024-05-22 RX ADMIN — Medication 20 MILLIEQUIVALENT(S): at 13:03

## 2024-05-22 RX ADMIN — CHLORHEXIDINE GLUCONATE 1 APPLICATION(S): 213 SOLUTION TOPICAL at 18:14

## 2024-05-22 RX ADMIN — Medication 1 MILLIGRAM(S): at 11:38

## 2024-05-22 NOTE — PATIENT PROFILE ADULT - FALL HARM RISK - HARM RISK INTERVENTIONS
Assistance with ambulation/Assistance OOB with selected safe patient handling equipment/Communicate Risk of Fall with Harm to all staff/Discuss with provider need for PT consult/Monitor for mental status changes/Monitor gait and stability/Provide patient with walking aids - walker, cane, crutches/Reinforce activity limits and safety measures with patient and family/Tailored Fall Risk Interventions/Toileting schedule using arm’s reach rule for commode and bathroom/Use of alarms - bed, chair and/or voice tab/Visual Cue: Yellow wristband and red socks/Bed in lowest position, wheels locked, appropriate side rails in place/Call bell, personal items and telephone in reach/Instruct patient to call for assistance before getting out of bed or chair/Non-slip footwear when patient is out of bed/[place] to call system/Physically safe environment - no spills, clutter or unnecessary equipment/Purposeful Proactive Rounding/Room/bathroom lighting operational, light cord in reach

## 2024-05-22 NOTE — PROGRESS NOTE ADULT - PROBLEM SELECTOR PLAN 2
-DVT prophylaxis  -SBIRT - Pt and son unable to provide medication list  - Son also mentions pt had a severe allergy to an anesthetic in the past, but doest know what. Patient retaining urine, on straight cath removed over 800cc.  Possibly opioid related (although may be false positive on urine tox).  Possible UTI  - F/u urinalysis  - Bladder scans  - Straight cath as needed

## 2024-05-22 NOTE — BH CONSULTATION LIAISON ASSESSMENT NOTE - SUMMARY
Pt is a 59 y/o SWF with hx of alcohol dependence, was bib EMS after pt was found confused in a nearby park. Psychiatry called for confusion and bizarre behaviors. pt seen, AOA x 1, communicates via whispering and spelling her words out, and presenting with nonsensical speech. pt poor historian, responding to internal stimuli, denies past psychiatric history, however pt not reliable historian. Pt did give permission to speak with her son, Asher.   Asher called, states pt has long hx of alcohol dependence, and went on 15 day binge, drinking up to 2 bottles of wine per night, and last 2 days stopped drinking and per son, pt became more confused and "deluded." Son reports staying home to watch her mother to keep from drinking, but he states he fell asleep, to wake up and did not see his mother in the house. Pt was found near a park in [place], passed out. Son states these deluded behaviors have occurred in the past and resolved over time. he denies hx of si/hi, and has never been diagnosed with a psychiatric disorder in the past.

## 2024-05-22 NOTE — BH CONSULTATION LIAISON ASSESSMENT NOTE - NSBHCONSULTRECOMMENDOTHER_PSY_A_CORE FT
1) concern for wernicke's, continue thiamine 500 mg IV TID x 3 days  2) continue [facility] protocol for alcohol withdrawal  3) for hallucinosis/delirium, consider haldol 1 mg IV TID along with ativan 0.5 mg IV TID  4) pt cannot leave AMA  5) consider neuro work up, EEG and brain scan [facility] Pregnancy And Lactation Text: This medication is Pregnancy Category B and is considered safe. There is some data to suggest avoiding during the third trimester. It is unknown if this medication is excreted in breast milk.

## 2024-05-22 NOTE — BH CONSULTATION LIAISON ASSESSMENT NOTE - INTERRUPTED ATTEMPT:
"Subjective:      Naty Verma is a 10 y.o. female here with mother. Patient brought in for Well Child (10 year old )    Neck pain  ? Related to sleep  No fever  No vomiting  No headache  + Pain with looking down  No injury      Does still have eczema- manages with hydrocortisone  Mainly in creases of arms  No daily lotion used    History of Present Illness:  Well Child Exam  Diet WNL: some fruits/vegetables- not as picky, Protein drinks in the morning, some yogurt, cheese, water, apple juice, occ. coke, chicken,     Growth, Elimination, Sleep - WNL - Growth chart normal  Physical Activity - abnormalities/concerns present (not as activity) -  School - normal ([employer] 3rd grade) -satisfactory academic performance  Household/Safety - WNL - safe environment, adult support for patient and appropriate carseat/belt use      Review of Systems   Constitutional: Negative for activity change, appetite change and fever.   HENT: Negative for congestion and sore throat.    Eyes: Negative for discharge and redness.   Respiratory: Negative for cough and wheezing.    Cardiovascular: Negative for chest pain and palpitations.   Gastrointestinal: Negative for constipation, diarrhea and vomiting.   Genitourinary: Negative for difficulty urinating, enuresis and hematuria.   Skin: Positive for rash. Negative for wound.   Neurological: Negative for syncope and headaches.   Psychiatric/Behavioral: Negative for behavioral problems and sleep disturbance.       Objective:     Vitals:    06/08/20 1434   BP: (!) 92/60   Pulse: 87   Resp: 18   Temp: 98.7 °F (37.1 °C)   TempSrc: Oral   Weight: 27.2 kg (59 lb 13.7 oz)   Height: 4' 6" (1.372 m)     Physical Exam   Constitutional: She appears well-developed and well-nourished. No distress.   HENT:   Right Ear: Tympanic membrane normal.   Left Ear: Tympanic membrane normal.   Nose: No nasal discharge.   Mouth/Throat: Mucous membranes are moist. Dentition is normal. Pharynx is normal.   Eyes: Pupils are " equal, round, and reactive to light. Conjunctivae and EOM are normal. Right eye exhibits no discharge. Left eye exhibits no discharge.   Neck: Normal range of motion. Neck supple. No neck adenopathy.   Mild pain with flexion, no TTP cervical spine   Cardiovascular: Normal rate, regular rhythm, S1 normal and S2 normal.   No murmur heard.  Pulmonary/Chest: Effort normal and breath sounds normal. She has no wheezes. She has no rhonchi. She has no rales.   Abdominal: Soft. Bowel sounds are normal. She exhibits no distension. There is no hepatosplenomegaly. There is no tenderness.   Genitourinary:   Genitourinary Comments: No labial adhesions  Moises II   Musculoskeletal: Normal range of motion. She exhibits no edema.   No scoliosis   Neurological: She is alert. She has normal reflexes.   Skin: Skin is warm. Rash (mild eczematous patches bilateral antecubital fossa) noted. No pallor.   Vitals reviewed.      Assessment:        1. Encounter for routine child health examination without abnormal findings    2. Atopic dermatitis, unspecified type    3. Neck pain         Plan:       Naty was seen today for well child.    Diagnoses and all orders for this visit:    Encounter for routine child health examination without abnormal findings    Atopic dermatitis, unspecified type    Neck pain       Discussed (nutrition,exercise,dental,school,behavior). Safety discussed. Object. Vision Screen: sees eye doctor  Interpretive Conf. Conducted.  Management of atopic derm reviewed- use of mild soaps, detergents, lotion daily- steriod cream for inflamed areas  Neck pain at this point suspected muscular- use of motrin/heating pad- if worsening especially if fever develops, headache, vomiting, recommend re-evaluation  Flu vaccine in fall  F/U yearly & prn     Unable to assess

## 2024-05-22 NOTE — CHART NOTE - NSCHARTNOTEFT_GEN_A_CORE
Notified by nursing staff that patient was agitated and patient received 1mg IVP haldol and 0.5mg IVP ativan together. Reviewed psych notes and changed orders to PRN. Patient Care manager(Joanna Castaneda) reached out regarding concern for nursing safety. I explained my concern for respiratory depression if patient is placed on standing haldol/ativan rather than PRN regarding respiratory depression, and Joanna expressed her concerns for nursing safety. I recommended notifying provider if there is escalating agitation despite PRNs placed and a provider will evaluate patient at bedside. I have also communicated with Dr. Danielle Briseno regarding this discussion who is in agreement that the medications should be placed PRN rather than standing as of this time. I messaged Joanna Castaneda via teams regarding the update as well.

## 2024-05-22 NOTE — PROGRESS NOTE ADULT - PROBLEM SELECTOR PLAN 1
-Continue lorazepam - Unclear etiology of acute encephalopathy, though likely related to drug intoxication. Preceding events may have been manic episode?  - CT head unremarkable  - uTOX + for opiates but negative for oxycodone. Check urine fentanyl  - Check TSH, HIV, UA  - Given binge drinking, will place on symptom triggered CIWA  - Psych consult in am when more alert  - Consider MRI brain if no improvement. Would need to confirm with Dr. Mcdaniel from ortho surg if can get MRI with spinal hardware. Unclear etiology of acute encephalopathy, though likely related to drug intoxication. Preceding events may have been manic episode vs intoxication vs missed.  CT head unremarkable.  uTOX + for opiates but negative for oxycodone, possibly secondary to loperamide.  TSH wnl  - Check urine fentanyl  - Check HIV, UA  - Given binge drinking, will place on symptom triggered CIWA  - Psych consulted, appreciate recs  - Consider MRI brain if no improvement although no concern for acute intracranial pathology at this time. Would need to confirm with Dr. Mcdaniel from ortho surg if can get MRI with spinal hardware. Unclear etiology of acute encephalopathy, though likely related to drug intoxication. Preceding events may have been manic episode vs intoxication vs mixed.  CT head unremarkable.  uTOX + for opiates but negative for oxycodone, possibly secondary to loperamide.  TSH wnl  - Check urine fentanyl  - Check HIV, UA  - Given binge drinking, will place on symptom triggered CIWA  - Psych consulted, appreciate recs  - Continue thiamine  - Consider MRI brain if no improvement although no concern for acute intracranial pathology at this time. Would need to confirm with Dr. Mcdaniel from ortho surg if can get MRI with spinal hardware.

## 2024-05-22 NOTE — BH CONSULTATION LIAISON ASSESSMENT NOTE - CURRENT MEDICATION
MEDICATIONS  (STANDING):  folic acid 1 milliGRAM(s) Oral daily  heparin   Injectable 5000 Unit(s) SubCutaneous every 12 hours  multivitamin 1 Tablet(s) Oral daily  thiamine IVPB 500 milliGRAM(s) IV Intermittent three times a day    MEDICATIONS  (PRN):  LORazepam     Tablet 2 milliGRAM(s) Oral every 2 hours PRN CIWA-Ar score increase by 2 points and a total score of 7 or less  LORazepam     Tablet 2 milliGRAM(s) Oral every 1 hour PRN CIWA-Ar score 8 or greater

## 2024-05-22 NOTE — PATIENT PROFILE ADULT - LEGAL HELP
Vital Signs Last 24 Hrs  T(C): 36.4 (14 Dec 2020 04:33), Max: 36.9 (13 Dec 2020 21:06)  T(F): 97.5 (14 Dec 2020 04:33), Max: 98.4 (13 Dec 2020 21:06)  HR: 73 (14 Dec 2020 04:33) (73 - 78)  BP: 93/67 (14 Dec 2020 04:33) (93/67 - 117/75)  BP(mean): --  RR: 18 (14 Dec 2020 04:33) (17 - 18)  SpO2: 95% (14 Dec 2020 04:33) (95% - 96%) no

## 2024-05-22 NOTE — BH CONSULTATION LIAISON ASSESSMENT NOTE - NSBHMSERECMEM_PSY_A_CORE
NEUROSURGERY    Jacqueline Jacob   (:  1972, MRN:  [id number], CSN:  [id number], [facility]  Admit Date:  2024      Hospital Day: 4  Admitting Provider:  No admitting provider for patient encounter.    Attending Provider:  Everton Mendez DO  Primary Care Physician:  Luis Miguel Chanel MD   _______________________________________________________________    Today, 2024   She reports everything is better, her headaches are managed with gabapentin and the mild left neck discomfort is managed with oxycodone.  No sore throat or hoarseness and swallowing improved.  She's very anxious to go home.  _______________________________________________________________    Temp:  [97.9 °F (36.6 °C)-98.9 °F (37.2 °C)] 98.9 °F (37.2 °C)  Heart Rate:  [] 75  Resp:  [16-20] 16  BP: (119-176)/(65-91) 143/83  Body mass index is 30.21 kg/m².   I/O last 3 completed shifts:  In: 3332.4 [P.O.:480; I.V.:2852.4]  Out: 700 [Urine:700]  GCS Score  Avg: 15  Min: 15  Max: 15     Awake, alert, oriented, interactive, no acute distress  CNII-XII grossly intact  Motor Exam demonstrates no fasciculations, wasting, or changes in tone in all extremities.  Left anterior neck mildly tender to light touch  No pronator drift.  Strength is 5/5 and equal bilaterally in all deltoids, biceps, triceps, wrist/finger extensors, psoas, quadriceps, gastrocnemius, anterior tibialis, EHL, and plantar flexors.    Sensation is grossly intact to touch in all upper and lower extremities.    Coordination is good in all upper and lower extremities.  Pulses are intact in all extremities, which are appropriate temperature.  No clubbing, cyanosis or edema.    ÓSCAR COMA SCALE  BEST EYE 4 - Eyes Open Spontaneously    VERBAL 5 - Alert and Oriented   MOTOR 6 - Follows Simple Motor Commands     GCS  total 15       _______________________________________________    Acute occlusive left transverse sinus, sigmoid sinus, jugular bulb and upper internal  jugular vein doing well on heparin drip and neurology guidance.  Symptoms of left neck pain, swelling, mild dysphagia, hoarseness, sore throat and headaches improved.  At this time there is no surgical indication from our standpoint but please call NS team if symptoms worsen.    VINICIO Munguia  1/29/2024   6:10 AM     Will see again on request.    I, Lyubov Head MD, personally performed the services described in this documentation in conjunction with JESSICA, as documented in my presence, and it is both accurate and complete after my personal editing.   Lyubov Head MD   1/29/2024    6:56 AM     Recent Labs   Lab 01/29/24  0333 01/28/24  0430 01/27/24  1729 01/27/24  1445 01/27/24  0830 01/27/24  0154 01/26/24 1925 01/26/24 1925   WBC 10.7 12.4*  --   --  14.3* 14.9*  --  14.3*   RBC 4.31 4.22  --   --  4.38 4.27  --  4.32   HGB 12.5 12.1  --   --  12.7 12.6  --  12.8   HCT 39.0 38.0  --   --  39.1 38.3  --  38.1    293 285  --  317 240  251  --  316   INR  --   --  1.0  --   --  1.0   < >  --    PT  --   --  10.8  --   --  10.5   < >  --    PTT 44* 47* 44*   < > 50* 29   < >  --     < > = values in this interval not displayed.     C-Reactive Protein (mg/dL)   Date Value   01/27/2024 10.8 (H)       Recent Labs   Lab 01/29/24  0334 01/28/24  0430 01/26/24  1936 01/26/24 1925   SODIUM 138 138  --  138   POTASSIUM 4.0 4.0  --  3.4   CHLORIDE 102 101  --  100   CO2 29 29  --  30   BUN 5* 5*  --  10   CREATININE 0.85 0.85   < > 0.82   GLUCOSE 107* 106*  --  131*   CALCIUM 8.8 8.2*  --  8.8   ALBUMIN 2.3* 2.4*  --  3.1*   TOTPROTEIN 6.6 6.5  --  7.2   AST 10 11  --  10   GPT 14 14  --  15   BILIRUBIN 0.4 0.7  --  0.4   ALKPT 72 70  --  82    < > = values in this interval not displayed.       Patient Vitals for the past 24 hrs:   BP Temp Temp src Pulse Resp SpO2   01/29/24 0500 (!) 143/83 -- -- 75 -- 97 %   01/29/24 0424 -- -- -- -- 16 --   01/29/24 0400 (!) 146/79 98.9 °F (37.2 °C) Oral 72 16 94 %    01/29/24 0300 (!) 144/81 -- -- 68 -- 94 %   01/29/24 0200 138/72 -- -- 70 -- 92 %   01/29/24 0100 (!) 142/67 -- -- 75 -- 93 %   01/29/24 0000 135/70 -- -- 75 18 93 %   01/28/24 2300 (!) 145/69 -- -- 72 -- 93 %   01/28/24 2200 123/87 -- -- 81 -- 95 %   01/28/24 2100 (!) 140/73 98.5 °F (36.9 °C) Oral 80 -- 94 %   01/28/24 2000 (!) 146/69 -- -- 78 16 93 %   01/28/24 1900 (!) 168/78 -- -- 83 -- 96 %   01/28/24 1837 (!) 147/84 -- -- 82 20 98 %   01/28/24 1744 (!) 164/83 -- -- 77 16 98 %   01/28/24 1740 -- -- -- -- 18 --   01/28/24 1649 -- -- -- 81 -- 98 %   01/28/24 1648 -- -- -- 78 -- 98 %   01/28/24 1647 -- -- -- 75 -- 96 %   01/28/24 1646 -- -- -- 74 -- 97 %   01/28/24 1645 -- -- -- 73 -- 96 %   01/28/24 1644 -- -- -- 72 -- 97 %   01/28/24 1643 -- -- -- 75 -- 98 %   01/28/24 1641 -- -- -- 85 -- 98 %   01/28/24 1640 -- -- -- 80 -- 97 %   01/28/24 1639 -- -- -- 77 -- 96 %   01/28/24 1638 -- -- -- 74 -- 96 %   01/28/24 1637 -- -- -- 80 -- 96 %   01/28/24 1636 -- -- -- 77 -- 97 %   01/28/24 1635 (!) 158/82 -- -- 82 -- 97 %   01/28/24 1634 -- -- -- 87 -- 94 %   01/28/24 1633 -- -- -- 73 -- 94 %   01/28/24 1632 -- -- -- 70 -- 93 %   01/28/24 1631 -- -- -- 71 -- 93 %   01/28/24 1630 -- -- -- 70 -- 93 %   01/28/24 1629 -- -- -- 71 -- 93 %   01/28/24 1628 -- -- -- 71 -- 94 %   01/28/24 1627 -- -- -- 71 -- 94 %   01/28/24 1626 -- -- -- 70 -- 94 %   01/28/24 1625 -- -- -- 71 -- 93 %   01/28/24 1624 -- -- -- 72 -- 94 %   01/28/24 1623 -- -- -- 72 -- 94 %   01/28/24 1622 -- -- -- 72 -- 94 %   01/28/24 1621 -- -- -- 72 -- 94 %   01/28/24 1620 -- -- -- 72 -- 94 %   01/28/24 1619 -- -- -- 72 -- 94 %   01/28/24 1618 -- -- -- 72 -- 94 %   01/28/24 1617 -- -- -- 73 -- 95 %   01/28/24 1616 -- -- -- 72 -- 95 %   01/28/24 1615 -- -- -- 73 -- 95 %   01/28/24 1614 -- -- -- 72 -- 95 %   01/28/24 1613 -- -- -- 72 -- 95 %   01/28/24 1612 -- -- -- 70 -- 95 %   01/28/24 1611 -- -- -- 71 -- 94 %   01/28/24 1610 -- -- -- 72 -- 95 %   01/28/24  1609 -- -- -- 70 -- 95 %   01/28/24 1608 -- -- -- 70 -- 95 %   01/28/24 1607 -- -- -- 72 -- 95 %   01/28/24 1606 -- -- -- 72 -- 94 %   01/28/24 1605 -- -- -- 73 -- 95 %   01/28/24 1604 -- -- -- 72 -- 96 %   01/28/24 1603 -- -- -- 72 -- 97 %   01/28/24 1602 -- -- -- 76 -- 97 %   01/28/24 1601 -- -- -- 80 -- 96 %   01/28/24 1600 -- 98.7 °F (37.1 °C) Oral 76 -- 96 %   01/28/24 1559 -- -- -- 83 -- 96 %   01/28/24 1558 -- -- -- (!) 106 -- 91 %   01/28/24 1557 -- -- -- 82 -- 97 %   01/28/24 1556 -- -- -- 79 -- 97 %   01/28/24 1555 -- -- -- 79 -- 98 %   01/28/24 1554 -- -- -- 79 -- 97 %   01/28/24 1553 -- -- -- 78 -- 97 %   01/28/24 1552 -- -- -- 79 -- 98 %   01/28/24 1551 -- -- -- 79 -- 98 %   01/28/24 1550 -- -- -- 79 -- 98 %   01/28/24 1549 -- -- -- 79 -- 98 %   01/28/24 1548 -- -- -- 79 -- 98 %   01/28/24 1547 -- -- -- 79 -- 98 %   01/28/24 1546 -- -- -- 79 -- 98 %   01/28/24 1545 -- -- -- 80 -- 98 %   01/28/24 1544 -- -- -- 90 -- 97 %   01/28/24 1543 -- -- -- 80 -- 99 %   01/28/24 1542 -- -- -- 83 -- 99 %   01/28/24 1541 -- -- -- 81 -- 98 %   01/28/24 1540 -- -- -- 82 -- 98 %   01/28/24 1539 -- -- -- 82 -- 97 %   01/28/24 1538 -- -- -- 86 -- 96 %   01/28/24 1537 -- -- -- 92 -- 96 %   01/28/24 1536 -- -- -- (!) 108 -- 97 %   01/28/24 1535 -- -- -- (!) 104 -- --   01/28/24 1534 -- -- -- 98 -- 97 %   01/28/24 1533 -- -- -- 81 -- 97 %   01/28/24 1532 -- -- -- 79 -- 97 %   01/28/24 1531 -- -- -- 81 -- 98 %   01/28/24 1530 -- -- -- 77 -- 97 %   01/28/24 1529 -- -- -- 79 -- 97 %   01/28/24 1528 -- -- -- 81 -- 97 %   01/28/24 1527 -- -- -- 81 -- 97 %   01/28/24 1526 -- -- -- 81 -- 97 %   01/28/24 1525 -- -- -- 80 -- 97 %   01/28/24 1524 -- -- -- 76 -- 98 %   01/28/24 1523 -- -- -- 75 -- 98 %   01/28/24 1522 -- -- -- 77 -- 98 %   01/28/24 1521 -- -- -- 80 -- 98 %   01/28/24 1520 -- -- -- 76 -- 98 %   01/28/24 1519 -- -- -- 77 -- 98 %   01/28/24 1518 -- -- -- 76 -- 98 %   01/28/24 1517 -- -- -- 77 -- 98 %   01/28/24 1516 -- --  -- 76 -- 98 %   01/28/24 1515 -- -- -- 77 -- 98 %   01/28/24 1514 -- -- -- 79 -- 97 %   01/28/24 1513 -- -- -- 74 -- 98 %   01/28/24 1512 -- -- -- 77 -- 98 %   01/28/24 1511 -- -- -- 77 -- 98 %   01/28/24 1510 -- -- -- 76 -- 97 %   01/28/24 1509 -- -- -- 74 -- 98 %   01/28/24 1508 -- -- -- 77 -- 97 %   01/28/24 1507 -- -- -- 76 -- 98 %   01/28/24 1506 -- -- -- 75 -- 99 %   01/28/24 1505 -- -- -- 77 -- 98 %   01/28/24 1504 -- -- -- 79 -- 98 %   01/28/24 1503 -- -- -- 76 -- 98 %   01/28/24 1502 -- -- -- 80 -- 97 %   01/28/24 1501 -- -- -- 76 -- 97 %   01/28/24 1500 (!) 151/90 -- -- 77 -- 97 %   01/28/24 1459 -- -- -- 77 -- 97 %   01/28/24 1458 -- -- -- 80 -- 96 %   01/28/24 1457 -- -- -- 85 -- 96 %   01/28/24 1456 -- -- -- 85 -- 96 %   01/28/24 1455 -- -- -- 86 -- 97 %   01/28/24 1454 -- -- -- 82 -- 97 %   01/28/24 1453 -- -- -- 77 -- 97 %   01/28/24 1452 -- -- -- 77 -- 97 %   01/28/24 1451 -- -- -- 74 -- 97 %   01/28/24 1450 -- -- -- 77 -- 96 %   01/28/24 1449 -- -- -- 76 -- 96 %   01/28/24 1448 -- -- -- 75 -- 95 %   01/28/24 1447 -- -- -- 74 -- 96 %   01/28/24 1446 -- -- -- 77 -- 97 %   01/28/24 1445 -- -- -- 75 -- 96 %   01/28/24 1444 -- -- -- 75 -- 96 %   01/28/24 1443 -- -- -- 74 -- 97 %   01/28/24 1442 -- -- -- 82 -- 96 %   01/28/24 1441 -- -- -- 76 -- 96 %   01/28/24 1440 -- -- -- 75 -- 96 %   01/28/24 1439 -- -- -- 80 -- 96 %   01/28/24 1438 -- -- -- 77 -- 96 %   01/28/24 1437 -- -- -- 78 -- 96 %   01/28/24 1436 -- -- -- 79 -- 96 %   01/28/24 1435 -- -- -- 81 -- 96 %   01/28/24 1434 -- -- -- 79 -- 95 %   01/28/24 1433 -- -- -- 79 -- 96 %   01/28/24 1432 -- -- -- 80 -- 96 %   01/28/24 1431 -- -- -- 84 -- 96 %   01/28/24 1430 -- -- -- 78 -- 95 %   01/28/24 1429 -- -- -- 72 -- 95 %   01/28/24 1428 -- -- -- 76 -- 95 %   01/28/24 1427 -- -- -- 76 -- 96 %   01/28/24 1426 -- -- -- 74 -- 95 %   01/28/24 1425 -- -- -- 76 -- 95 %   01/28/24 1424 -- -- -- 72 -- 95 %   01/28/24 1423 -- -- -- 74 -- 95 %   01/28/24 1422 --  -- -- 74 -- 96 %   01/28/24 1421 -- -- -- 74 -- 95 %   01/28/24 1420 -- -- -- 75 -- 96 %   01/28/24 1419 -- -- -- 76 -- 96 %   01/28/24 1418 -- -- -- 75 -- 96 %   01/28/24 1417 -- -- -- 76 -- 96 %   01/28/24 1416 -- -- -- 75 -- 96 %   01/28/24 1415 -- -- -- 78 -- 97 %   01/28/24 1414 -- -- -- 79 -- 97 %   01/28/24 1413 -- -- -- 81 -- 96 %   01/28/24 1412 -- -- -- 76 -- 96 %   01/28/24 1411 -- -- -- 80 -- 96 %   01/28/24 1410 -- -- -- 78 -- 97 %   01/28/24 1409 -- -- -- 74 -- 97 %   01/28/24 1408 -- -- -- 79 -- 97 %   01/28/24 1407 -- -- -- 81 -- 97 %   01/28/24 1406 -- -- -- 76 -- 96 %   01/28/24 1405 -- -- -- 77 -- 96 %   01/28/24 1404 -- -- -- 76 -- 95 %   01/28/24 1403 -- -- -- 75 -- 97 %   01/28/24 1402 -- -- -- 74 -- 97 %   01/28/24 1401 -- -- -- 74 -- 97 %   01/28/24 1400 (!) 146/91 -- -- 75 -- 97 %   01/28/24 1313 -- -- -- -- 16 --   01/28/24 1300 127/85 -- -- 82 -- 94 %   01/28/24 1259 -- 97.9 °F (36.6 °C) Oral -- -- --   01/28/24 1200 (!) 141/81 -- -- 80 18 97 %   01/28/24 1100 128/65 -- -- 92 -- 98 %   01/28/24 1000 (!) 176/76 -- -- 89 -- 97 %   01/28/24 0911 -- -- -- -- 16 --   01/28/24 0900 (!) 144/75 -- -- 89 -- 95 %   01/28/24 0800 131/74 -- -- 77 16 93 %   01/28/24 0738 -- 98.9 °F (37.2 °C) Axillary -- -- --   01/28/24 0700 119/72 -- -- 77 -- 97 %       Date 01/28/24 0700 - 01/29/24 0659 01/29/24 0700 - 01/30/24 0659   Shift 3773-9844 5922-6366 8363-9877 24 Hour Total 0726-6558 3561-3497 1823-8339 24 Hour Total   INTAKE   P.O. 360 120  480       I.V.  2852.4  2852.4       Shift Total 360 2972.4  3332.4       OUTPUT   Shift Total           Weight (kg) 84.9 84.9 84.9 84.9 84.9 84.9 84.9 84.9          IV Medications:  heparin infusion (thromboembolism protocol), Last Rate: 20 Units/kg/hr (01/29/24 0427)  sodium chloride  sodium chloride      Scheduled Medications:  Current Facility-Administered Medications   Medication Dose Route Frequency Provider Last Rate Last Admin    potassium CHLORIDE (KLOR-CON M)  yolanda ER tablet 40 mEq  40 mEq Oral Once Everton Mendez DO        pantoprazole (PROTONIX INJECT) injection 40 mg  40 mg Intravenous Nightly Everton Mendez DO   40 mg at 01/28/24 2034    sodium chloride 0.9 % flush bag 25 mL  25 mL Intravenous PRN Mahi Muniz MD        sodium chloride 0.9 % injection 2 mL  2 mL Intracatheter 2 times per day Mahi Muniz MD   2 mL at 01/28/24 2034    ondansetron (ZOFRAN ODT) disintegrating tablet 4 mg  4 mg Oral Q12H PRN Mahi Muniz MD        Or    ondansetron (ZOFRAN) injection 4 mg  4 mg Intravenous Q12H PRN Mahi Muniz MD   4 mg at 01/27/24 1552    melatonin tablet 3 mg  3 mg Oral Nightly PRN Mahi Muniz MD        lamoTRIgine (LaMICtal) tablet 400 mg  400 mg Oral Daily Mahi Muniz MD   400 mg at 01/28/24 0802    heparin (porcine) 25,000 units/250 mL in dextrose 5 % infusion  1-40 Units/kg/hr (Order-Specific) Intravenous Continuous Mahi Muniz MD 17 mL/hr at 01/29/24 0427 20 Units/kg/hr at 01/29/24 0427    heparin (porcine) injection 6,800 Units  80 Units/kg (Order-Specific) Intravenous PRN Mahi Muniz MD        heparin (porcine) injection 3,400 Units  40 Units/kg (Order-Specific) Intravenous PRN Mahi Muniz MD   3,400 Units at 01/29/24 0427    piperacillin-tazobactam (ZOSYN) 3.375 g in sodium chloride 0.9 % 100 mL IVPB  3.375 g Intravenous 3 times per day Mahi Muniz MD   Completed at 01/29/24 0312    sodium chloride (NORMAL SALINE) 0.9 % bolus 500 mL  500 mL Intravenous PRN Estrellita Vázquez MD        sodium chloride 0.9% infusion   Intravenous Continuous PRN Everton Mendez DO        sodium chloride 0.9% infusion   Intravenous Continuous PRN Everton Mendez A, DO        sodium chloride (NORMAL SALINE) 0.9 % bolus 500 mL  500 mL Intravenous PRN Everton Mendez A, DO        sodium chloride 0.9 % flush bag 25 mL  25 mL Intravenous PRN Everton Mendez A, DO        ipratropium-albuterol  (DUONEB) 0.5-2.5 (3) MG/3ML nebulizer solution 3 mL  3 mL Nebulization Q6H Resp PRN Everton Mendez DO        Potassium Standard Replacement Protocol (Levels 3.5 and lower)   Does not apply See Admin Instructions Everton Mendez DO        Potassium Replacement (Levels 3.6 - 4)   Does not apply See Admin Instructions Everton Mendez DO        Magnesium Standard Replacement Protocol   Does not apply See Admin Instructions Everton Mendez DO        Phosphorus Standard Replacement Protocol   Does not apply See Admin Instructions Everton Mendez DO        acetaminophen (TYLENOL) tablet 1,000 mg  1,000 mg Oral Q6H Everton Mendez DO   1,000 mg at 01/29/24 0009    gabapentin (NEURONTIN) capsule 300 mg  300 mg Oral 3 times per day Everton Mendez DO   300 mg at 01/28/24 2145    oxyCODONE (IMM REL) (ROXICODONE) tablet 5 mg  5 mg Oral Q4H PRN Everton Mendez DO   5 mg at 01/29/24 0424    HYDROmorphone (DILAUDID) injection 0.2 mg  0.2 mg Intravenous Q2H PRN Everton Mendez DO   0.2 mg at 01/28/24 0432    polyethylene glycol (MIRALAX) packet 17 g  17 g Oral Daily Everton Mendez DO         Facility-Administered Medications Ordered in Other Encounters   Medication Dose Route Frequency Provider Last Rate Last Admin    gadobutrol (GADAVIST) injection 7 mL  7 mL Intravenous Once Stuppy, Jamaal, RTR         PRN Medications:  sodium chloride, 25 mL, PRN  ondansetron, 4 mg, Q12H PRN   Or  ondansetron (ZOFRAN) IV/IM, 4 mg, Q12H PRN  melatonin, 3 mg, Nightly PRN  heparin (porcine), 80 Units/kg (Order-Specific), PRN  heparin (porcine), 40 Units/kg (Order-Specific), PRN  sodium chloride, 500 mL, PRN  sodium chloride, , Continuous PRN  sodium chloride, , Continuous PRN  sodium chloride, 500 mL, PRN  sodium chloride, 25 mL, PRN  ipratropium-albuterol, 3 mL, Q6H Resp PRN  oxyCODONE (IMM REL), 5 mg, Q4H PRN  HYDROmorphone, 0.2 mg, Q2H PRN          Above all reviewed    Impaired

## 2024-05-22 NOTE — PROGRESS NOTE ADULT - ASSESSMENT
Patient is a 58y old  Female with PMH of alcohol use disorder who presents with a chief complaint of Encephalopathy.  58F with PMH R breast cancer (s/p double mastectomy (2017), RT) lumbar stenosis s/p L2-3 fusion, GERD, and glaucoma brought in after being found down outside with reported visual/auditory hallucinations in setting of recent binge drinking. Found to have + opiates in urine. Admitted for encephalopathy evaluation and this morning found to have diaphoresis and a tremor in her hands.

## 2024-05-22 NOTE — PROGRESS NOTE ADULT - PROBLEM SELECTOR PLAN 3
Diet: Regular  - Aspiration precautions  DVT Proph: heparin sub q  Dispo: Pending course. - Pt and son unable to provide medication list  - Son also mentions pt had a severe allergy to an anesthetic in the past, but doest know what.

## 2024-05-22 NOTE — BH CONSULTATION LIAISON ASSESSMENT NOTE - NSELEVCHRSUICRISK_PSY_ALL_CORE
Chart reviewed for carvedilol refill request  LOV 5-  No scheduled follow up  Medication refilled per protocol via eprescribe Yes

## 2024-05-22 NOTE — PROGRESS NOTE ADULT - SUBJECTIVE AND OBJECTIVE BOX
Jordi Coyne MS3    Patient is a 58y old  Female who presents with a chief complaint of Encephalopathy (21 May 2024 19:42)      SUBJECTIVE/INTERVAL EVENTS: Patient seen and examined at bedside.    MEDICATIONS  (STANDING):  folic acid 1 milliGRAM(s) Oral daily  heparin   Injectable 5000 Unit(s) SubCutaneous every 12 hours  multivitamin 1 Tablet(s) Oral daily  thiamine IVPB 500 milliGRAM(s) IV Intermittent three times a day    MEDICATIONS  (PRN):  LORazepam     Tablet 2 milliGRAM(s) Oral every 2 hours PRN CIWA-Ar score increase by 2 points and a total score of 7 or less  LORazepam     Tablet 2 milliGRAM(s) Oral every 1 hour PRN CIWA-Ar score 8 or greater      VITAL SIGNS:  T(F): 97.7 (05-22-24 @ 03:35), Max: 99.5 (05-21-24 @ 23:02)  HR: 72 (05-22-24 @ 03:35) (72 - 105)  BP: 120/73 (05-22-24 @ 03:35) (120/73 - 166/76)  RR: 18 (05-22-24 @ 03:35) (16 - 18)  SpO2: 98% (05-22-24 @ 03:35) (96% - 100%)    I&O's Summary    22 May 2024 07:01  -  22 May 2024 08:41  --------------------------------------------------------  IN: 80 mL / OUT: 0 mL / NET: 80 mL      Daily Height in cm: 172.72 (21 May 2024 13:21)    Daily     PHYSICAL EXAM:  Gen: Alert only to place  HEENT: NCAT, conjunctiva clear, sclera anicteric, no erythema or exudates in the oropharynx, mmm  Neck: Supple, no JVD  CV: RRR, S1S2, no m/r/g  Resp: CTAB, normal respiratory effort  Abd: Soft, nontender, nondistended, normal bowel sounds  Ext: no edema, no clubbing or cyanosis  Neuro: AOx1, CN2-12 grossly intact, YOUNG  SKIN: warm, perfused    Mental status exam:  The patient appears stated age, fair hygiene and dressed appropriately. Patient was noticeably diaphoretic. The patient was nervous, and at times cooperative with the interview and maintained limited eye contact.  No psychomotor agitation or retardation was noted but patient did have some resting tremor in hands. The patient's speech was choppy with reduced volume and rate. Affect is constricted and stable.  The patient's thoughts are linear and goal directed.  Reports no delusions and reports auditory and visual hallucinations. Denies any suicidal IIPbut reports homicidal ideation without intent or plan.  Insight is limited.  Judgment is poor.  Impulse control has been fair on the unit.    LABS:                        13.6   3.91  )-----------( 256      ( 22 May 2024 06:55 )             40.5     Hgb Trend: 13.6<--, 14.0<--  05-22    141  |  105  |  14  ----------------------------<  84  3.2<L>   |  23  |  0.57    Ca    9.1      22 May 2024 06:50  Phos  3.7     05-22  Mg     2.2     05-22    TPro  6.7  /  Alb  4.2  /  TBili  0.6  /  DBili  x   /  AST  37  /  ALT  35  /  AlkPhos  83  05-22    Creatinine Trend: 0.57<--, 0.71<--  LIVER FUNCTIONS - ( 22 May 2024 06:50 )  Alb: 4.2 g/dL / Pro: 6.7 g/dL / ALK PHOS: 83 U/L / ALT: 35 U/L / AST: 37 U/L / GGT: x           PT/INR - ( 22 May 2024 06:55 )   PT: 10.8 sec;   INR: 0.98 ratio         PTT - ( 22 May 2024 06:55 )  PTT:28.0 sec      Urinalysis Basic - ( 22 May 2024 06:50 )    Color: x / Appearance: x / SG: x / pH: x  Gluc: 84 mg/dL / Ketone: x  / Bili: x / Urobili: x   Blood: x / Protein: x / Nitrite: x   Leuk Esterase: x / RBC: x / WBC x   Sq Epi: x / Non Sq Epi: x / Bacteria: x    Urine toxicology screening (05.21.24 @ 15:35)   THC, Urine Qualitative: Negative   Oxycodone, Urine: Negative    Methadone, Urine: Negative   Phencyclidine Level, Urine: Negative    Barbiturates Screen, Urine: Negative   Opiate, Urine: Positive    Amphetamine, Urine: Negative    Cocaine Metabolite, Urine: Negative    Benzodiazepine, Urine: Negative        CAPILLARY BLOOD GLUCOSE  POCT Blood Glucose.: 120 mg/dL (21 May 2024 13:41)      RADIOLOGY & ADDITIONAL TESTS: Reviewed    Imaging Personally Reviewed:    Consultant(s) Notes Reviewed:      Care Discussed with Consultants/Other Providers:   Jordi Coyne MS3    Patient is a 58y old  Female who presents with a chief complaint of Encephalopathy (21 May 2024 19:42)      SUBJECTIVE/INTERVAL EVENTS: Patient seen and examined at bedside.    MEDICATIONS  (STANDING):  folic acid 1 milliGRAM(s) Oral daily  heparin   Injectable 5000 Unit(s) SubCutaneous every 12 hours  multivitamin 1 Tablet(s) Oral daily  thiamine IVPB 500 milliGRAM(s) IV Intermittent three times a day    MEDICATIONS  (PRN):  LORazepam     Tablet 2 milliGRAM(s) Oral every 2 hours PRN CIWA-Ar score increase by 2 points and a total score of 7 or less  LORazepam     Tablet 2 milliGRAM(s) Oral every 1 hour PRN CIWA-Ar score 8 or greater      VITAL SIGNS:  T(F): 97.7 (05-22-24 @ 03:35), Max: 99.5 (05-21-24 @ 23:02)  HR: 72 (05-22-24 @ 03:35) (72 - 105)  BP: 120/73 (05-22-24 @ 03:35) (120/73 - 166/76)  RR: 18 (05-22-24 @ 03:35) (16 - 18)  SpO2: 98% (05-22-24 @ 03:35) (96% - 100%)    I&O's Summary    22 May 2024 07:01  -  22 May 2024 08:41  --------------------------------------------------------  IN: 80 mL / OUT: 0 mL / NET: 80 mL      Daily Height in cm: 172.72 (21 May 2024 13:21)    Daily     PHYSICAL EXAM:  Gen: Alert only to place  HEENT: NCAT, conjunctiva clear, sclera anicteric, no erythema or exudates in the oropharynx, mmm  Neck: Supple, no JVD  CV: RRR, S1S2, no m/r/g  Resp: CTAB, normal respiratory effort  Abd: Soft, nontender, nondistended, normal bowel sounds  Ext: no edema, no clubbing or cyanosis  Neuro: AOx1, CN2-12 grossly intact, YOUNG  SKIN: warm, perfused    Mental status exam:  The patient appears stated age, fair hygiene and dressed appropriately. Patient was noticeably diaphoretic. The patient was nervous, and at times cooperative with the interview and maintained limited eye contact.  No psychomotor agitation or retardation was noted but patient did have some resting tremor in hands. The patient's speech was choppy with reduced volume and rate. Affect is constricted and stable.  The patient's thoughts are linear and goal directed.  Reports no delusions and reports auditory and visual hallucinations. Denies any suicidal IIPbut reports homicidal ideation without intent or plan.  Insight is limited.  Judgment is poor.  Impulse control has been fair on the unit.    LABS:                        13.6   3.91  )-----------( 256      ( 22 May 2024 06:55 )             40.5     Hgb Trend: 13.6<--, 14.0<--  05-22    141  |  105  |  14  ----------------------------<  84  3.2<L>   |  23  |  0.57    Ca    9.1      22 May 2024 06:50  Phos  3.7     05-22  Mg     2.2     05-22    TPro  6.7  /  Alb  4.2  /  TBili  0.6  /  DBili  x   /  AST  37  /  ALT  35  /  AlkPhos  83  05-22    Creatinine Trend: 0.57<--, 0.71<--  LIVER FUNCTIONS - ( 22 May 2024 06:50 )  Alb: 4.2 g/dL / Pro: 6.7 g/dL / ALK PHOS: 83 U/L / ALT: 35 U/L / AST: 37 U/L / GGT: x           PT/INR - ( 22 May 2024 06:55 )   PT: 10.8 sec;   INR: 0.98 ratio         PTT - ( 22 May 2024 06:55 )  PTT:28.0 sec      Urinalysis Basic - ( 22 May 2024 06:50 )    Color: x / Appearance: x / SG: x / pH: x  Gluc: 84 mg/dL / Ketone: x  / Bili: x / Urobili: x   Blood: x / Protein: x / Nitrite: x   Leuk Esterase: x / RBC: x / WBC x   Sq Epi: x / Non Sq Epi: x / Bacteria: x    Urine toxicology screening (05.21.24 @ 15:35)   THC, Urine Qualitative: Negative   Oxycodone, Urine: Negative    Methadone, Urine: Negative   Phencyclidine Level, Urine: Negative    Barbiturates Screen, Urine: Negative   Opiate, Urine: Positive    Amphetamine, Urine: Negative    Cocaine Metabolite, Urine: Negative    Benzodiazepine, Urine: Negative        CAPILLARY BLOOD GLUCOSE  POCT Blood Glucose.: 120 mg/dL (21 May 2024 13:41)      RADIOLOGY & ADDITIONAL TESTS: Reviewed    Imaging Personally Reviewed: CT head unremarkable    Consultant(s) Notes Reviewed:      Care Discussed with Consultants/Other Providers:   Jordi Coyne MS3    Patient is a 58y old  Female who presents with a chief complaint of Encephalopathy (21 May 2024 19:42)      SUBJECTIVE/INTERVAL EVENTS: Patient seen and examined at bedside.  Patient alternates between whispering and speaking, makes signs.  Reports that she is having AVH but is not able to describe them.  Denies SI but does report that she wants to hurt somebody but upon further questioning she says she is dreaming.  Appears to fall in and out of sleep on interview    MEDICATIONS  (STANDING):  folic acid 1 milliGRAM(s) Oral daily  heparin   Injectable 5000 Unit(s) SubCutaneous every 12 hours  multivitamin 1 Tablet(s) Oral daily  thiamine IVPB 500 milliGRAM(s) IV Intermittent three times a day    MEDICATIONS  (PRN):  LORazepam     Tablet 2 milliGRAM(s) Oral every 2 hours PRN CIWA-Ar score increase by 2 points and a total score of 7 or less  LORazepam     Tablet 2 milliGRAM(s) Oral every 1 hour PRN CIWA-Ar score 8 or greater      VITAL SIGNS:  T(F): 97.7 (05-22-24 @ 03:35), Max: 99.5 (05-21-24 @ 23:02)  HR: 72 (05-22-24 @ 03:35) (72 - 105)  BP: 120/73 (05-22-24 @ 03:35) (120/73 - 166/76)  RR: 18 (05-22-24 @ 03:35) (16 - 18)  SpO2: 98% (05-22-24 @ 03:35) (96% - 100%)    I&O's Summary    22 May 2024 07:01  -  22 May 2024 08:41  --------------------------------------------------------  IN: 80 mL / OUT: 0 mL / NET: 80 mL      Daily Height in cm: 172.72 (21 May 2024 13:21)    Daily     PHYSICAL EXAM:  Gen: NAD, diaphoretic, altered  HEENT: NCAT, conjunctiva clear, sclera anicteric, no erythema or exudates in the oropharynx, mmm  Neck: Supple  CV: RRR, S1S2, no m/r/g  Resp: CTAB, normal respiratory effort, no w/r/r  Abd: Soft, nontender, nondistended, normal bowel sounds  Ext: no edema, no clubbing or cyanosis  Neuro: AOx1, CN2-12 grossly intact, no focal deficits, 2-3+ patellar reflexes  SKIN: warm, perfused  PSYCH: See MSE    Mental status exam:  The patient appears stated age, fair hygiene and dressed appropriately. Patient was noticeably diaphoretic. The patient was nervous, and at times cooperative with the interview and maintained limited eye contact.  No psychomotor agitation or retardation was noted but patient did have some resting tremor in hands. The patient's speech was choppy with reduced volume and rate. Affect is constricted and stable.  The patient's thoughts are linear and goal directed.  Reports no delusions and reports auditory and visual hallucinations but unable to further characterize. Denies any suicidal IIP but reports ideation about harming others but is unable to go into further detail.  Insight is limited.  Judgment is poor.  Impulse control has been fair on the unit.    LABS:                        13.6   3.91  )-----------( 256      ( 22 May 2024 06:55 )             40.5     Hgb Trend: 13.6<--, 14.0<--  05-22    141  |  105  |  14  ----------------------------<  84  3.2<L>   |  23  |  0.57    Ca    9.1      22 May 2024 06:50  Phos  3.7     05-22  Mg     2.2     05-22    TPro  6.7  /  Alb  4.2  /  TBili  0.6  /  DBili  x   /  AST  37  /  ALT  35  /  AlkPhos  83  05-22    Creatinine Trend: 0.57<--, 0.71<--  LIVER FUNCTIONS - ( 22 May 2024 06:50 )  Alb: 4.2 g/dL / Pro: 6.7 g/dL / ALK PHOS: 83 U/L / ALT: 35 U/L / AST: 37 U/L / GGT: x           PT/INR - ( 22 May 2024 06:55 )   PT: 10.8 sec;   INR: 0.98 ratio         PTT - ( 22 May 2024 06:55 )  PTT:28.0 sec      Urinalysis Basic - ( 22 May 2024 06:50 )    Color: x / Appearance: x / SG: x / pH: x  Gluc: 84 mg/dL / Ketone: x  / Bili: x / Urobili: x   Blood: x / Protein: x / Nitrite: x   Leuk Esterase: x / RBC: x / WBC x   Sq Epi: x / Non Sq Epi: x / Bacteria: x    Urine toxicology screening (05.21.24 @ 15:35)   THC, Urine Qualitative: Negative   Oxycodone, Urine: Negative    Methadone, Urine: Negative   Phencyclidine Level, Urine: Negative    Barbiturates Screen, Urine: Negative   Opiate, Urine: Positive    Amphetamine, Urine: Negative    Cocaine Metabolite, Urine: Negative    Benzodiazepine, Urine: Negative        CAPILLARY BLOOD GLUCOSE  POCT Blood Glucose.: 120 mg/dL (21 May 2024 13:41)      RADIOLOGY & ADDITIONAL TESTS: Reviewed    Imaging Personally Reviewed: CT head unremarkable    Consultant(s) Notes Reviewed:      Care Discussed with Consultants/Other Providers:

## 2024-05-22 NOTE — BH CONSULTATION LIAISON ASSESSMENT NOTE - HPI (INCLUDE ILLNESS QUALITY, SEVERITY, DURATION, TIMING, CONTEXT, MODIFYING FACTORS, ASSOCIATED SIGNS AND SYMPTOMS)
Pt is a 59 y/o SWF with hx of alcohol dependence, was bib EMS after pt was found confused in a nearby park. Psychiatry called for confusion and bizarre behaviors. pt seen, AOA x 1, communicates via whispering and spelling her words out, and presenting with nonsensical speech. pt poor historian, responding to internal stimuli, denies past psychiatric history, however pt not reliable historian. Pt did give permission to speak with her son, Asher.   Asher called, states pt has long hx of alcohol dependence, and went on 15 day binge, drinking up to 2 bottles of wine per night, and last 2 days stopped drinking and per son, pt became more confused and "deluded." Son reports staying home to watch her mother to keep from drinking, but he states he fell asleep, to wake up and did not see his mother in the house. Pt was found near a park in [place], passed out. Son states these deluded behaviors have occurred in the past and resolved over time. he denies hx of si/hi, and has never been diagnosed with a psychiatric disorder in the past.

## 2024-05-22 NOTE — BH CONSULTATION LIAISON ASSESSMENT NOTE - NSBHCHARTREVIEWVS_PSY_A_CORE FT
Vital Signs Last 24 Hrs  T(C): 36.6 (22 May 2024 11:21), Max: 37.5 (21 May 2024 23:02)  T(F): 97.9 (22 May 2024 11:21), Max: 99.5 (21 May 2024 23:02)  HR: 84 (22 May 2024 11:21) (72 - 90)  BP: 134/80 (22 May 2024 11:21) (120/73 - 164/93)  BP(mean): --  RR: 18 (22 May 2024 11:21) (16 - 18)  SpO2: 94% (22 May 2024 11:21) (94% - 100%)    Parameters below as of 22 May 2024 11:21  Patient On (Oxygen Delivery Method): room air

## 2024-05-23 LAB
ALBUMIN SERPL ELPH-MCNC: 3.8 G/DL — SIGNIFICANT CHANGE UP (ref 3.3–5)
ALP SERPL-CCNC: 75 U/L — SIGNIFICANT CHANGE UP (ref 40–120)
ALT FLD-CCNC: 31 U/L — SIGNIFICANT CHANGE UP (ref 10–45)
ANION GAP SERPL CALC-SCNC: 12 MMOL/L — SIGNIFICANT CHANGE UP (ref 5–17)
APPEARANCE UR: CLEAR — SIGNIFICANT CHANGE UP
AST SERPL-CCNC: 28 U/L — SIGNIFICANT CHANGE UP (ref 10–40)
BACTERIA # UR AUTO: NEGATIVE /HPF — SIGNIFICANT CHANGE UP
BASOPHILS # BLD AUTO: 0.05 K/UL — SIGNIFICANT CHANGE UP (ref 0–0.2)
BASOPHILS NFR BLD AUTO: 1.2 % — SIGNIFICANT CHANGE UP (ref 0–2)
BILIRUB SERPL-MCNC: 0.4 MG/DL — SIGNIFICANT CHANGE UP (ref 0.2–1.2)
BILIRUB UR-MCNC: NEGATIVE — SIGNIFICANT CHANGE UP
BUN SERPL-MCNC: 13 MG/DL — SIGNIFICANT CHANGE UP (ref 7–23)
CALCIUM SERPL-MCNC: 9.5 MG/DL — SIGNIFICANT CHANGE UP (ref 8.4–10.5)
CAST: 0 /LPF — SIGNIFICANT CHANGE UP (ref 0–4)
CHLORIDE SERPL-SCNC: 105 MMOL/L — SIGNIFICANT CHANGE UP (ref 96–108)
CO2 SERPL-SCNC: 23 MMOL/L — SIGNIFICANT CHANGE UP (ref 22–31)
COLOR SPEC: YELLOW — SIGNIFICANT CHANGE UP
CREAT SERPL-MCNC: 0.6 MG/DL — SIGNIFICANT CHANGE UP (ref 0.5–1.3)
DIFF PNL FLD: NEGATIVE — SIGNIFICANT CHANGE UP
EGFR: 105 ML/MIN/1.73M2 — SIGNIFICANT CHANGE UP
EOSINOPHIL # BLD AUTO: 0.18 K/UL — SIGNIFICANT CHANGE UP (ref 0–0.5)
EOSINOPHIL NFR BLD AUTO: 4.4 % — SIGNIFICANT CHANGE UP (ref 0–6)
GLUCOSE SERPL-MCNC: 105 MG/DL — HIGH (ref 70–99)
GLUCOSE UR QL: NEGATIVE MG/DL — SIGNIFICANT CHANGE UP
HCT VFR BLD CALC: 40.6 % — SIGNIFICANT CHANGE UP (ref 34.5–45)
HGB BLD-MCNC: 13.6 G/DL — SIGNIFICANT CHANGE UP (ref 11.5–15.5)
IMM GRANULOCYTES NFR BLD AUTO: 0.2 % — SIGNIFICANT CHANGE UP (ref 0–0.9)
KETONES UR-MCNC: NEGATIVE MG/DL — SIGNIFICANT CHANGE UP
LEUKOCYTE ESTERASE UR-ACNC: ABNORMAL
LYMPHOCYTES # BLD AUTO: 1.59 K/UL — SIGNIFICANT CHANGE UP (ref 1–3.3)
LYMPHOCYTES # BLD AUTO: 39 % — SIGNIFICANT CHANGE UP (ref 13–44)
MAGNESIUM SERPL-MCNC: 2.2 MG/DL — SIGNIFICANT CHANGE UP (ref 1.6–2.6)
MCHC RBC-ENTMCNC: 30.8 PG — SIGNIFICANT CHANGE UP (ref 27–34)
MCHC RBC-ENTMCNC: 33.5 GM/DL — SIGNIFICANT CHANGE UP (ref 32–36)
MCV RBC AUTO: 92.1 FL — SIGNIFICANT CHANGE UP (ref 80–100)
MONOCYTES # BLD AUTO: 0.47 K/UL — SIGNIFICANT CHANGE UP (ref 0–0.9)
MONOCYTES NFR BLD AUTO: 11.5 % — SIGNIFICANT CHANGE UP (ref 2–14)
MRSA PCR RESULT.: SIGNIFICANT CHANGE UP
NEUTROPHILS # BLD AUTO: 1.78 K/UL — LOW (ref 1.8–7.4)
NEUTROPHILS NFR BLD AUTO: 43.7 % — SIGNIFICANT CHANGE UP (ref 43–77)
NITRITE UR-MCNC: NEGATIVE — SIGNIFICANT CHANGE UP
NRBC # BLD: 0 /100 WBCS — SIGNIFICANT CHANGE UP (ref 0–0)
PH UR: 6 — SIGNIFICANT CHANGE UP (ref 5–8)
PHOSPHATE SERPL-MCNC: 3.8 MG/DL — SIGNIFICANT CHANGE UP (ref 2.5–4.5)
PLATELET # BLD AUTO: 232 K/UL — SIGNIFICANT CHANGE UP (ref 150–400)
POTASSIUM SERPL-MCNC: 3.9 MMOL/L — SIGNIFICANT CHANGE UP (ref 3.5–5.3)
POTASSIUM SERPL-SCNC: 3.9 MMOL/L — SIGNIFICANT CHANGE UP (ref 3.5–5.3)
PROT SERPL-MCNC: 6.5 G/DL — SIGNIFICANT CHANGE UP (ref 6–8.3)
PROT UR-MCNC: NEGATIVE MG/DL — SIGNIFICANT CHANGE UP
RBC # BLD: 4.41 M/UL — SIGNIFICANT CHANGE UP (ref 3.8–5.2)
RBC # FLD: 12.4 % — SIGNIFICANT CHANGE UP (ref 10.3–14.5)
RBC CASTS # UR COMP ASSIST: 1 /HPF — SIGNIFICANT CHANGE UP (ref 0–4)
S AUREUS DNA NOSE QL NAA+PROBE: SIGNIFICANT CHANGE UP
SODIUM SERPL-SCNC: 140 MMOL/L — SIGNIFICANT CHANGE UP (ref 135–145)
SP GR SPEC: 1.01 — SIGNIFICANT CHANGE UP (ref 1–1.03)
SQUAMOUS # UR AUTO: 0 /HPF — SIGNIFICANT CHANGE UP (ref 0–5)
UROBILINOGEN FLD QL: 0.2 MG/DL — SIGNIFICANT CHANGE UP (ref 0.2–1)
WBC # BLD: 4.08 K/UL — SIGNIFICANT CHANGE UP (ref 3.8–10.5)
WBC # FLD AUTO: 4.08 K/UL — SIGNIFICANT CHANGE UP (ref 3.8–10.5)
WBC UR QL: 5 /HPF — SIGNIFICANT CHANGE UP (ref 0–5)

## 2024-05-23 PROCEDURE — 99232 SBSQ HOSP IP/OBS MODERATE 35: CPT | Mod: GC

## 2024-05-23 PROCEDURE — 99231 SBSQ HOSP IP/OBS SF/LOW 25: CPT

## 2024-05-23 RX ADMIN — Medication 1 TABLET(S): at 11:51

## 2024-05-23 RX ADMIN — HEPARIN SODIUM 5000 UNIT(S): 5000 INJECTION INTRAVENOUS; SUBCUTANEOUS at 05:18

## 2024-05-23 RX ADMIN — HEPARIN SODIUM 5000 UNIT(S): 5000 INJECTION INTRAVENOUS; SUBCUTANEOUS at 17:54

## 2024-05-23 RX ADMIN — Medication 105 MILLIGRAM(S): at 14:33

## 2024-05-23 RX ADMIN — Medication 105 MILLIGRAM(S): at 05:18

## 2024-05-23 RX ADMIN — CHLORHEXIDINE GLUCONATE 1 APPLICATION(S): 213 SOLUTION TOPICAL at 11:52

## 2024-05-23 RX ADMIN — Medication 1 MILLIGRAM(S): at 11:51

## 2024-05-23 RX ADMIN — HALOPERIDOL DECANOATE 1 MILLIGRAM(S): 100 INJECTION INTRAMUSCULAR at 01:24

## 2024-05-23 RX ADMIN — Medication 105 MILLIGRAM(S): at 21:38

## 2024-05-23 NOTE — DISCHARGE NOTE PROVIDER - NSDCCPTREATMENT_GEN_ALL_CORE_FT
PRINCIPAL PROCEDURE  Procedure: CT head  Findings and Treatment:   < end of copied text >  FINDINGS:  CT HEAD:  No acute transcortical infarct or intracranial hemorrhage.  The ventricles are normal without evidence of hydrocephalus. There are no   extra-axial fluid collections.  The visualized intraorbital contents are unremarkable. Mucous retention   cyst in the right maxillary sinus. The mastoid air cells are clear. The   visualized soft tissues and osseous structures appear normal.  CT CERVICAL SPINE:  No acute fracture or acute subluxation.  Multilevel degenerative changes. Mild anterolisthesis of C7 on T1   secondary to facet joint arthrosis. Severe disc degenerative changes at   C4-C5and C6-C7. Limited evaluation of the spinal canal soft tissue   contents by CT.  There is no prevertebral or paraspinal soft tissue swelling.  Included lung apices are clear.  IMPRESSION:  CT head:  -No acute intracranial findings.  CT cervical spine:  -No acute fracture or dislocation.< from: CT Head No Cont (05.21.24 @ 15:46) >

## 2024-05-23 NOTE — DISCHARGE NOTE PROVIDER - HOSPITAL COURSE
Discharge Summary     Admit Date: 05-21-24  Discharge Date:****    Admission diagnoses:   ***  Altered mental status        Discharge diagnoses:   ***    Hospital Course:   For full details, please see H&P, progress notes, consult notes and ancillary notes. Briefly, FARHAN SAWYER is a 58F with PMH R breast cancer (s/p double mastectomy (2017), RT) lumbar stenosis s/p L2-3 fusion, GERD, and glaucoma brought in after being found down outside near a school. Per ED, son Grabiel was previously at bedside and states that pt had been binge drinking for past 15 days. Last known drink was 48 hrs ago. However, around 9am today pt ran out of the house and found down in a baseball field around 1pm.    During hospitalization, patient's behavior was bizarre and characterized by fluctuating speech patterns, fluctuatins orientation including name, and agitation.  Psychiatry was consulted, and there is a concern that the patient was experiencing Wernicke-Korsakoff syndrome.  Treated with IV thiamine 500 TID x3 days.  Placed on CIWA protocol.  Although urine was positive for opiates, may be in the setting of loperamide.    While hospitalized the patient was retaining urine*******    On day of discharge, patient is clinically stable with no new exam findings or acute symptoms compared to prior. The patient was seen by the attending physician on the date of discharge and deemed stable and acceptable for discharge. The patient's chronic medical conditions were treated accordingly per the patient's home medication regimen. The patient's medication reconciliation (with changes made to chronic medications), follow up appointments, discharge orders, instructions, and significant lab and diagnostic studies are as noted.     Discharge follow up action items:     1. Follow up with PCP in 1-2 weeks.   2. Follow up labs, path, & imaging ***  3. Medication changes ***  4. On hold medications ***    Patient's ordered code status: ***    Patient disposition: ***     Discharge Summary     Admit Date: 05-21-24  Discharge Date: 5/24/2024    Admission diagnoses:     Altered mental status    Discharge diagnoses:     Altered mental status    Hospital Course:   For full details, please see H&P, progress notes, consult notes and ancillary notes. Briefly, FARHAN SAWYER is a 58F with PMH R breast cancer (s/p double mastectomy (2017), RT) lumbar stenosis s/p L2-3 fusion, GERD, and glaucoma brought in after being found down outside near a school. Per ED, son Grabiel was previously at bedside and states that pt had been binge drinking for past 15 days. Last known drink was 48 hrs ago. However, around 9am today pt ran out of the house and found down in a baseball field around 1pm.    During hospitalization, patient's behavior was bizarre and characterized by fluctuating speech patterns, fluctuatins orientation including name, and agitation.  Psychiatry was consulted, and there is a concern that the patient was experiencing Wernicke-Korsakoff syndrome.  Treated with IV thiamine 500 TID x3 days.  Placed on CIWA protocol.  Although urine was positive for opiates, may be in the setting of loperamide.  Patient quickly returned to baseline.    While hospitalized the patient was retaining urine which resolved on her second day of hospitalization    On day of discharge, patient is clinically stable with no new exam findings or acute symptoms compared to prior. The patient was seen by the attending physician on the date of discharge and deemed stable and acceptable for discharge. The patient's chronic medical conditions were treated accordingly per the patient's home medication regimen. The patient's medication reconciliation (with changes made to chronic medications), follow up appointments, discharge orders, instructions, and significant lab and diagnostic studies are as noted.     Discharge follow up action items:     1. Follow up with PCP in 1-2 weeks.  Follow up with [facility] psychiatry in 1 week  2. Follow up labs, path, & imaging:  N/A  3. Medication changes:  N/A  4. On hold medications:  N/A    Patient's ordered code status:     Patient disposition: Home

## 2024-05-23 NOTE — BH CONSULTATION LIAISON PROGRESS NOTE - NSBHCONSULTRECOMMENDOTHER_PSY_A_CORE FT
1) concern for wernicke's, continue thiamine 500 mg IV TID x 3 days  2) continue [facility] protocol for alcohol withdrawal  3) for hallucinosis/delirium, consider haldol 1 mg IV TID along with ativan 0.5 mg IV TID  4) pt cannot leave AMA  5) consider neuro work up, EEG and brain scan

## 2024-05-23 NOTE — PROVIDER CONTACT NOTE (OTHER) - SITUATION
Patient agitated after completing bladder scan, trying to get out of bed, kicking towards RN needing to use the bathroom. Attempted to reorient and contacted provider for OK to give PRN haldol.
stat order of haldol and ativan given to pt late due to agitation

## 2024-05-23 NOTE — BH CONSULTATION LIAISON PROGRESS NOTE - NS ED BHA REVIEW OF ED CHART AVAILABLE LABS REVIEWED
65y old  Female who presents with a chief complaint of fever, chills, sob, generalized malaise at home. 65y old  Female who presents with a chief complaint of fever, chills, sob, generalized malaise, found to have metabolic encephalopathy 2/2 Klebsiella bacteremia. Yes

## 2024-05-23 NOTE — DIETITIAN INITIAL EVALUATION ADULT - OTHER INFO
Patient reported NKFA, confirmed by chart. Patient's height noted as 5'8" in chart, patient reports her height is 5'4" which is confirmed by review of [facility] growth chart. Patient reported her UBW is normally ~55kg/121lbs but unable to express if her BW had significantly changed in the recent past. No recent weights by [facility] growth chart, earliest weight is from 1.5 years ago of 125lbs on 11/5/2022, 145lbs (9/30/2022), 125lbs (8/24/2022). Will monitor weight trend.    - Previous hypokalemia 5/22, WNL as of today. Supplementation noted.  - Thiamine, folic acid, MVI noted.

## 2024-05-23 NOTE — PROGRESS NOTE ADULT - PROBLEM SELECTOR PLAN 2
Patient retaining urine, on straight cath removed over 800cc.  Possibly opioid related (although may be false positive on urine tox).  Possible UTI  - F/u urinalysis  - Bladder scans  - Straight cath as needed

## 2024-05-23 NOTE — DIETITIAN INITIAL EVALUATION ADULT - REASON FOR ADMISSION
Altered mental status     Altered mental status    Per chart, patient is a 59 y/o female with PMH including R breast cancer (s/p double mastectomy in 2017, RT), lumbar stenosis (s/p L2-3 fusion), GERD, glaucoma. Patient presents to [facility] after being found down outside near a school, with patient reportedly had been binge drinking for 15 days PTA. Admitted for encephalopathy evaluation per MD.

## 2024-05-23 NOTE — PROGRESS NOTE ADULT - TIME BILLING
- Ordering, reviewing, and interpreting labs, testing, and imaging.  - Independently obtaining a review of systems and performing a physical exam  - Reviewing consultant documentation/recommendations  - Counselling and educating patient and family regarding interpretation of aforementioned items and plan of care.
- Ordering, reviewing, and interpreting labs, testing, and imaging.  - Independently obtaining a review of systems and performing a physical exam  - Reviewing consultant documentation/recommendations  - Counselling and educating patient and family regarding interpretation of aforementioned items and plan of care.

## 2024-05-23 NOTE — DISCHARGE NOTE PROVIDER - NSFOLLOWUPCLINICS_GEN_ALL_ED_FT
[facility] - Primary Care  Primary Care  [address] [place] [address]  Phone: [phone]  Fax:   Follow Up Time: 2 weeks    [facility]  Psychiatry  [address] [place] [address]  Phone: [phone]  Fax:   Follow Up Time: 1 week

## 2024-05-23 NOTE — PROGRESS NOTE ADULT - PROBLEM SELECTOR PLAN 3
- Pt and son unable to provide medication list  - Son also mentions pt had a severe allergy to an anesthetic in the past, but doest know what.

## 2024-05-23 NOTE — BH CONSULTATION LIAISON PROGRESS NOTE - CURRENT MEDICATION
MEDICATIONS  (STANDING):  chlorhexidine 2% Cloths 1 Application(s) Topical daily  folic acid 1 milliGRAM(s) Oral daily  heparin   Injectable 5000 Unit(s) SubCutaneous every 12 hours  multivitamin 1 Tablet(s) Oral daily  thiamine IVPB 500 milliGRAM(s) IV Intermittent three times a day    MEDICATIONS  (PRN):  haloperidol    Injectable 1 milliGRAM(s) IV Push three times a day PRN agitation  LORazepam     Tablet 2 milliGRAM(s) Oral every 2 hours PRN CIWA-Ar score increase by 2 points and a total score of 7 or less  LORazepam     Tablet 2 milliGRAM(s) Oral every 1 hour PRN CIWA-Ar score 8 or greater  LORazepam   Injectable 0.5 milliGRAM(s) IV Push every 8 hours PRN Agitation

## 2024-05-23 NOTE — DIETITIAN INITIAL EVALUATION ADULT - PROBLEM SELECTOR PLAN 1
- Unclear etiology of acute encephalopathy, though likely related to drug intoxication. Preceding events may have been manic episode?  - CT head unremarkable  - uTOX + for opiates but negative for oxycodone. Check urine fentanyl  - Check TSH, HIV, UA  - Given binge drinking, will place on symptom triggered CIWA  - Psych consult in am when more alert  - Consider MRI brain if no improvement. Would need to confirm with Dr. Mcdaniel from ortho surg if can get MRI with spinal hardware

## 2024-05-23 NOTE — DISCHARGE NOTE PROVIDER - NSDCFUADDAPPT_GEN_ALL_CORE_FT
APPTS ARE READY TO BE MADE: [X] YES    Best Family or Patient Contact (if needed):    Additional Information about above appointments (if needed):    1: Primary care appointment with Dr Barron Simms if possible at [address] [place] [address]  2:   3:     Other comments or requests:

## 2024-05-23 NOTE — BH CONSULTATION LIAISON PROGRESS NOTE - NSBHCHARTREVIEWVS_PSY_A_CORE FT
Vital Signs Last 24 Hrs  T(C): 36.7 (23 May 2024 11:05), Max: 36.7 (23 May 2024 08:11)  T(F): 98 (23 May 2024 11:05), Max: 98.1 (23 May 2024 08:11)  HR: 66 (23 May 2024 11:05) (66 - 84)  BP: 148/87 (23 May 2024 11:05) (109/71 - 148/87)  BP(mean): --  RR: 17 (23 May 2024 11:05) (17 - 17)  SpO2: 95% (23 May 2024 11:05) (95% - 98%)    Parameters below as of 23 May 2024 11:05  Patient On (Oxygen Delivery Method): room air

## 2024-05-23 NOTE — PROVIDER CONTACT NOTE (OTHER) - BACKGROUND
DX: altered mental status
Patient was admitted for AMS, CIWA patient last score 4 at 23:30 on 5/22/24. Previously on day shift patient went to bathroom and became combative towards staff.

## 2024-05-23 NOTE — DIETITIAN INITIAL EVALUATION ADULT - PROBLEM SELECTOR PLAN 2
- Pt and son unable to provide medication list  - Son also mentions pt had a severe allergy to an anesthetic in the past, but doest know what

## 2024-05-23 NOTE — PROVIDER CONTACT NOTE (OTHER) - ACTION/TREATMENT ORDERED:
TEAMS Andrei Kelly notified. Will cancel standing order of haldol and ativan. Ordering a PRN order for haldol instead.
Resident Atsumi Kimura gave the OK to give PRN haldol. Plan of care continues, next CIWA assessment at 03:30am 5/23/24. Patient is back in bed, safety maintained and bed alarm is on.

## 2024-05-23 NOTE — DIETITIAN INITIAL EVALUATION ADULT - ENERGY INTAKE
Per d/w RN patient has been eating very well so far during admission at meal times with no acute issues. Adequate (%)

## 2024-05-23 NOTE — PROGRESS NOTE ADULT - PROBLEM SELECTOR PLAN 1
Unclear etiology of acute encephalopathy, though likely related to drug intoxication. Preceding events may have been manic episode vs intoxication vs mixed.  CT head unremarkable.  uTOX + for opiates but negative for oxycodone, possibly secondary to loperamide.  TSH wnl    - Check HIV neg,  - Given binge drinking, will place on symptom triggered CIWA  - Psych consulted, appreciate recs  - Continue thiamine 500mg for txt per   - appears to be waxing/waning w/inattentiveness at times.   -PRN haldol and ativan per BH

## 2024-05-23 NOTE — DIETITIAN INITIAL EVALUATION ADULT - PERTINENT LABORATORY DATA
05-23    140  |  105  |  13  ----------------------------<  105<H>  3.9   |  23  |  0.60    Ca    9.5      23 May 2024 06:38  Phos  3.8     05-23  Mg     2.2     05-23    TPro  6.5  /  Alb  3.8  /  TBili  0.4  /  DBili  x   /  AST  28  /  ALT  31  /  AlkPhos  75  05-23

## 2024-05-23 NOTE — PROVIDER CONTACT NOTE (OTHER) - RECOMMENDATIONS
[facility] SERVICE  Chief Complaint   Patient presents with   • Skilled Nursing Home Physician Admission      6/9/2022     Anticipated next visit:  06/15/2022    Usual primary provider:  FLORESITA Diane         FACILITY:  [facility]    NEW CONCERNS:  Serosanguineous oozing at incision.  Patient currently on aspirin, Brilinta, and Eliquis.  Patient's  adamantly refusing to hold Eliquis for 1 week while the bleeding stops.  He states that “Dr. Enamorado said that I should never stop her blood thinners”     Advance directive:  [facility] activated.  Her  is her agent.  Full code.    Please see the facility Medication Administration Record for the most accurate medication list.    MEDS:  Current Outpatient Medications   Medication Sig   • AMIODarone (PACERONE) 200 MG tablet Take 1 tablet by mouth daily.   • cephalexin (Keflex) 500 MG capsule Take 1 capsule by mouth 4 times daily. Take through 5/12/22   • traMADol (ULTRAM) 50 MG tablet 1 to 2 every 6 hours prn pain   • acetaminophen (TYLENOL) 500 MG tablet Take 2 tablets by mouth 3 times daily.   • metoPROLOL tartrate (LOPRESSOR) 50 MG tablet Take 1 tablet by mouth every 12 hours.   • losartan (COZAAR) 50 MG tablet Take 1 tablet by mouth daily. Do not start before June 9, 2022.   • docusate sodium (Colace) 100 MG capsule Take 1 capsule by mouth 2 times daily for 7 days.   • lidocaine (LMX) 4 % cream Apply 1 application topically as needed for Pain.   • Carboxymethylcellulose Sodium (ARTIFICIAL TEARS OP) Apply 1 drop to eye daily as needed.   • MELATONIN PO Take 2 capsules by mouth nightly as needed.   • ondansetron (ZOFRAN ODT) 4 MG disintegrating tablet Place 1 tablet onto the tongue every 8 hours as needed for Nausea.   • polyethylene glycol (MIRALAX) 17 GM/SCOOP powder Take 17 g by mouth daily.   • tiZANidine (ZANAFLEX) 2 MG tablet Take 1 tablet by mouth daily as needed (leg pain).   • hydroCORTisone (CORTIZONE) 2.5 % cream Apply 1 application  Resident Atsumi Kimura notified, OK to take patient to bathroom- if unsuccessful would advise to call a code grey. topically 3 times daily. Apply until redness is gone   • Euthyrox 75 MCG tablet Take 75 mcg by mouth daily.   • pantoprazole (PROTONIX) 40 MG tablet Take 1 tablet by mouth daily (before breakfast).   • apixaBAN (ELIQUIS) 2.5 MG Tab Take 1 tablet by mouth every 12 hours.   • aspirin 81 MG chewable tablet Chew 1 tablet by mouth daily. Do not start before March 8, 2022.   • atorvastatin (LIPITOR) 40 MG tablet Take 1 tablet by mouth nightly.   • ticagrelor (BRILINTA) 90 MG Tab Take 1 tablet by mouth every 12 hours.   • TURMERIC PO Take 1 capsule by mouth daily.   • Apoaequorin (Prevagen Extra Strength) 20 MG Cap Take 20 mg by mouth daily.   • nystatin (MYCOSTATIN) 533522 UNIT/GM cream Apply to affected areas twice daily as needed.   • Hydrocortisone Acetate (VAGISIL EX) Apply topically as needed.    • Ascorbic Acid (VITAMIN C) 500 MG tablet Take 500 mg by mouth 2 times daily.    • cholecalciferol (VITAMIN D3) 1000 UNITS tablet Take 2,000 Units by mouth daily.    • MAGNESIUM OXIDE PO Take 400 mg by mouth daily.      No current facility-administered medications for this visit.                                                                      IMMUNIZATIONS:   Immunization History   Administered Date(s) Administered   • Influenza, high dose quadrivalent, preservative-free 10/22/2020, 10/25/2021   • Influenza, high dose seasonal, preservative-free 01/22/2018   • Pneumococcal Conjugate 13 Valent Vacc (Prevnar 13) 02/10/2016   • Pneumococcal Polysaccharide Vacc (Pneumovax 23) 04/24/2007   • Td:Adult type tetanus/diphtheria 05/04/2009, 08/16/2010   • Zoster Shingles 02/25/2016     ALLERGIES:   ALLERGIES:   Allergen Reactions   • Cat Dander Other (See Comments)   • Peanut - Dietary Use Only Other (See Comments)   • Pollen Other (See Comments)     ragweeod       PROBLEMS:   Patient Active Problem List    Diagnosis Date Noted   • Closed comminuted intertrochanteric fracture of proximal end of left femur (CMS/HCC) 06/02/2022      Priority: Low   • History of ST elevation myocardial infarction (STEMI) 2022     Priority: Low   • Plantar fasciitis of left foot 2021     Priority: Low   • Peripheral vascular disease (CMS/[facility]) 10/22/2020     Priority: Low     Overview:   With L calf pain with walking.     • Chronic midline low back pain with bilateral sciatica 10/22/2020     Priority: Low   • Sacroiliac dysfunction 2019     Priority: Low   • Hypothyroid 2015     Priority: Low   • Atrophic vaginitis 01/10/2014     Priority: Low   • Osteoporosis 2010     Priority: Low     MEDICAL HISTORY:   Past Medical History:   Diagnosis Date   • Chronic pain    • Coronary artery disease involving native coronary artery of native heart without angina pectoris    • Hypothyroidism    • Memory deficit     family reports short term   • Reflux esophagitis 10/22/2020   • STEMI (ST elevation myocardial infarction) (CMS/[facility]) 2022     SURGICAL HISTORY:   Past Surgical History:   Procedure Laterality Date   • Anes tonsillectomy & adenoidectomy; age  age 7   • Cataract extraction, bilateral     • Colonoscopy w biopsy  2017    Sessile serrated adnoma, tubular adenoma polyps, Diverticulosis - next exam due in 3 years - Dr Smith   • Esophagogastroduodenoscopy transoral flex w/bx single or mult  2017    NERD - Dr Smith   • Eye surgery     • Skin biopsy     • Tonsillectomy       FAMILY HISTORY:   Family History   Problem Relation Age of Onset   • Thyroid Father    • Osteoarthritis Father    • Dementia/Alzheimers Father    • COPD Father    • Hearing Loss Father    • Stroke Mother         Mini strokes   • Stroke Brother         Fatal brain aneurism   • Aneurysm Brother         Brain   • Patient is unaware of any medical problems Daughter    • Patient is unaware of any medical problems Son    • COPD Brother      Review of patient's family status indicates:    Father                                           Mother                                            Brother                                          Daughter                       Alive                     Son                            Alive                     Brother                        Alive                       REVIEW OF SYSTEMS  Metabolic: No change in weight, undue fatigue, fever or chills.   HEENT: No change in vision or hearing.  No congestion.   CHEST: No dyspnea or cough.   HEART: No palpitation or chest pain. history of STEMI 3/1222, with PTCA and stenting of 100 %occlusion of the ostial RCA.  History of paroxysmal atrial fibrillation as well as V-tach.  GASTROINTESTINAL: No constipation, diarrhea or abdominal pain or cramping.  Gallbladder problems?    GENITOURINARY:  Urinary retention following surgery required several catheterizations.  Now has an indwelling Rogers catheter until removed by Urology.  MUSCULOSKELETAL:  Has left hip fracture 2022  NEUROLOGIC: No headache or dizziness.    PSYCH:  History of dementia  SKIN: No rash or changing lesions.              Patient Care Team:  FLORESITA Paz as PCP - General (Nurse Practitioner)  John Cowan OD as Ophthalmology (Optometry)  Outside Provider (Dentist - General Practice)  FLORESITA Liao as Post Acute Facility Provider: APC (Nurse Practitioner)  Akash Hampton MD as Post Acute Facility Provider: Physician (Family Practice)  THERAPY:  Speech, physical, and occupational therapies ordered    PRE HOSPITAL HOME SITUATION       Lives in a ranch home with her .  Normally can ambulate without any assisted devices, although he has a 2 wheel walker.  Bedroom, bathroom, kitchen, laundry facility all on the 1st floor.  One step to get into the home.     SUBJECTIVE        Please refer to discharge summary of patient's 2022 to 22 [facility] stay for further details.  This 85-year-old  white female with a history of hypertension, coronary artery  disease, paroxysmal atrial fibrillation, pay Rx is Maul failure fibrillation, and advanced dementia somehow managed to slip when standing up from her recliner chair.  She fell down and sustained an intertrochanteric fracture of the left hip.  ORIF performed 06/03/2022.  Patient had blood loss anemia with 3 units of blood.  Her anticoagulants were placed on hold.  They have now been resumed.  Patient has had a stable hemoglobin for the past 3 days.  She was transferred to [facility] for subacute rehab.  Has a little bit of difficulties with pain control at this time.      OBJECTIVE:  Alert, oriented to self only, follows simple 1 step commands.         Vitals:    06/09/22 2019   BP: 115/68   Pulse: (!) 54   Resp: 16   Temp: 97.5 °F (36.4 °C)     HEENT: Pupils equal, round, reactive to light (PERRL), no facial weakness.  Moderate conjunctiva pallor  NECK: No cervical or supraclavicular lymphadenopathy.  No enlarged thyroid.  No carotid bruits.   CHEST: Clear to ausculation.   HEART: Rhythm regular, no murmur, no S3, S4.   ABDOMEN: Soft, bowel sounds present, no tenderness, masses, or hepatosplenomegaly.  NEUROLOGIC:  Hand grasps symmetric.  Quadriceps strength symmetric  EXTREMITIES:  Trace pretibial edema.    SKIN:  Surgical dressing on the left hip intermediate soaked with serosanguineous drainage (in spite of this,  refused to allow me to hold her Eliquis for a week)         LABS:  Hemoglobin 9.7 (preop 11.4)      BUN 16  Creatinine 0.8.  GFR 65.  K 3.8.  Glucose 88      Diana was seen today for skilled nursing home physician admission.    Diagnoses and all orders for this visit:    Closed comminuted intertrochanteric fracture of left femur with routine healing, subsequent encounter  CBC, BMP 06/10/2022 and 06/13/2022.  Physical and occupational therapies.  Increase tramadol to 50 milligrams, 1-2 q.6h p.r.n. for pain  Scheduled Tylenol 1 gram t.i.d.     Hypothyroidism, unspecified type  Continue current  meds.    Peripheral vascular disease (CMS/HCC)  Continue current meds.    History of ST elevation myocardial infarction (STEMI)  Continue current meds.    Dementia without behavioral disturbance, unspecified dementia type (CMS/HCC)  Continue current meds.        Akash Hampton MD  [phone] office  [phone] office cell  [phone] home cell

## 2024-05-23 NOTE — PROGRESS NOTE ADULT - ASSESSMENT
58F with PMH R breast cancer (s/p double mastectomy (2017), RT) lumbar stenosis s/p L2-3 fusion, GERD, and glaucoma brought in after being found down outside with reported visual/auditory hallucinations in setting of recent binge drinking. Found to have + opiates in urine. Admitted for encephalopathy evaluation and this morning found to have diaphoresis and a tremor in her hands.

## 2024-05-23 NOTE — DIETITIAN INITIAL EVALUATION ADULT - ORAL INTAKE PTA/DIET HISTORY
Patient reported that she was eating well PTA w/ no issues. Denied chewing/swallowing impairment or nausea/vomiting. No further significant PTA diet history obtained during this encounter 2/2 drowsiness and going to sleep. Noted that patient had been reported to be binge drinking x15 days leading into admission per H&P.

## 2024-05-23 NOTE — BH CONSULTATION LIAISON PROGRESS NOTE - NSBHFUPINTERVALHXFT_PSY_A_CORE
pt states she still feels lousy, tired, denies other complaints. pt denies psychosis, bo, depression. denies si/hi. denies withdawal symtpoms. reports fair sleep, pt received haldol prn last night.

## 2024-05-23 NOTE — PROGRESS NOTE ADULT - SUBJECTIVE AND OBJECTIVE BOX
Andrei Kelly, PGY3  Pager [phone] [facility] or [phone] [facility]    Patient is a 58y old  Female who presents with a chief complaint of Encephalopathy (23 May 2024 06:45)      SUBJECTIVE/INTERVAL EVENTS: Patient seen and examined at bedside.    MEDICATIONS  (STANDING):  chlorhexidine 2% Cloths 1 Application(s) Topical daily  folic acid 1 milliGRAM(s) Oral daily  heparin   Injectable 5000 Unit(s) SubCutaneous every 12 hours  multivitamin 1 Tablet(s) Oral daily  thiamine IVPB 500 milliGRAM(s) IV Intermittent three times a day    MEDICATIONS  (PRN):  haloperidol    Injectable 1 milliGRAM(s) IV Push three times a day PRN agitation  LORazepam     Tablet 2 milliGRAM(s) Oral every 2 hours PRN CIWA-Ar score increase by 2 points and a total score of 7 or less  LORazepam     Tablet 2 milliGRAM(s) Oral every 1 hour PRN CIWA-Ar score 8 or greater  LORazepam   Injectable 0.5 milliGRAM(s) IV Push every 8 hours PRN Agitation      VITAL SIGNS:  T(F): 98 (05-23-24 @ 11:05), Max: 98.1 (05-23-24 @ 08:11)  HR: 66 (05-23-24 @ 11:05) (66 - 84)  BP: 148/87 (05-23-24 @ 11:05) (109/71 - 148/87)  RR: 17 (05-23-24 @ 11:05) (17 - 17)  SpO2: 95% (05-23-24 @ 11:05) (95% - 98%)    I&O's Summary    22 May 2024 07:01  -  23 May 2024 07:00  --------------------------------------------------------  IN: 160 mL / OUT: 820 mL / NET: -660 mL    23 May 2024 07:01  -  23 May 2024 12:54  --------------------------------------------------------  IN: 80 mL / OUT: 0 mL / NET: 80 mL      Daily     Daily     PHYSICAL EXAM:  Gen: Alert, NAD  HEENT: NCAT, conjunctiva clear, sclera anicteric, no erythema or exudates in the oropharynx, mmm  Neck: Supple, no JVD  CV: RRR, S1S2, no m/r/g  Resp: CTAB, normal respiratory effort  Abd: Soft, nontender, nondistended, normal bowel sounds  Ext: no edema, no clubbing or cyanosis  Neuro: AOx3, CN2-12 grossly intact, YOUNG  SKIN: warm, perfused    LABS:                        13.6   4.08  )-----------( 232      ( 23 May 2024 06:38 )             40.6     Hgb Trend: 13.6<--, 13.6<--, 14.0<--  05-23    140  |  105  |  13  ----------------------------<  105<H>  3.9   |  23  |  0.60    Ca    9.5      23 May 2024 06:38  Phos  3.8     05-23  Mg     2.2     05-23    TPro  6.5  /  Alb  3.8  /  TBili  0.4  /  DBili  x   /  AST  28  /  ALT  31  /  AlkPhos  75  05-23    Creatinine Trend: 0.60<--, 0.57<--, 0.71<--  LIVER FUNCTIONS - ( 23 May 2024 06:38 )  Alb: 3.8 g/dL / Pro: 6.5 g/dL / ALK PHOS: 75 U/L / ALT: 31 U/L / AST: 28 U/L / GGT: x           PT/INR - ( 22 May 2024 06:55 )   PT: 10.8 sec;   INR: 0.98 ratio         PTT - ( 22 May 2024 06:55 )  PTT:28.0 sec      Urinalysis Basic - ( 23 May 2024 06:38 )    Color: x / Appearance: x / SG: x / pH: x  Gluc: 105 mg/dL / Ketone: x  / Bili: x / Urobili: x   Blood: x / Protein: x / Nitrite: x   Leuk Esterase: x / RBC: x / WBC x   Sq Epi: x / Non Sq Epi: x / Bacteria: x        CAPILLARY BLOOD GLUCOSE          RADIOLOGY & ADDITIONAL TESTS: Reviewed    Imaging Personally Reviewed:    Consultant(s) Notes Reviewed:      Care Discussed with Consultants/Other Providers:   Andrei Kelly, PGY3  Pager [phone] [facility] or [phone] [facility]    Patient is a 58y old  Female who presents with a chief complaint of Encephalopathy (23 May 2024 06:45)      SUBJECTIVE/INTERVAL EVENTS: Patient seen and examined at bedside.  Patient alert and responding to questions appropriately but stated she does not know how exactly she ended up in the hospital or why she is still here. Does not endorse any cp/sob/abd pain.    MEDICATIONS  (STANDING):  chlorhexidine 2% Cloths 1 Application(s) Topical daily  folic acid 1 milliGRAM(s) Oral daily  heparin   Injectable 5000 Unit(s) SubCutaneous every 12 hours  multivitamin 1 Tablet(s) Oral daily  thiamine IVPB 500 milliGRAM(s) IV Intermittent three times a day    MEDICATIONS  (PRN):  haloperidol    Injectable 1 milliGRAM(s) IV Push three times a day PRN agitation  LORazepam     Tablet 2 milliGRAM(s) Oral every 2 hours PRN CIWA-Ar score increase by 2 points and a total score of 7 or less  LORazepam     Tablet 2 milliGRAM(s) Oral every 1 hour PRN CIWA-Ar score 8 or greater  LORazepam   Injectable 0.5 milliGRAM(s) IV Push every 8 hours PRN Agitation      VITAL SIGNS:  T(F): 98 (05-23-24 @ 11:05), Max: 98.1 (05-23-24 @ 08:11)  HR: 66 (05-23-24 @ 11:05) (66 - 84)  BP: 148/87 (05-23-24 @ 11:05) (109/71 - 148/87)  RR: 17 (05-23-24 @ 11:05) (17 - 17)  SpO2: 95% (05-23-24 @ 11:05) (95% - 98%)    I&O's Summary    22 May 2024 07:01  -  23 May 2024 07:00  --------------------------------------------------------  IN: 160 mL / OUT: 820 mL / NET: -660 mL    23 May 2024 07:01  -  23 May 2024 12:54  --------------------------------------------------------  IN: 80 mL / OUT: 0 mL / NET: 80 mL      Daily     Daily     PHYSICAL EXAM:  Gen: Alert, NAD  HEENT: NCAT, conjunctiva clear, sclera anicteric, mmm  Neck: Supple, no JVD  CV: RRR, S1S2, no m/r/g  Resp: CTAB, normal respiratory effort  Abd: Soft, nontender, nondistended, normal bowel sounds  Ext: no edema, no clubbing or cyanosis  Neuro: AOx3, CN2-12 grossly intact, YOUNG  SKIN: warm, perfused    LABS:                        13.6   4.08  )-----------( 232      ( 23 May 2024 06:38 )             40.6     Hgb Trend: 13.6<--, 13.6<--, 14.0<--  05-23    140  |  105  |  13  ----------------------------<  105<H>  3.9   |  23  |  0.60    Ca    9.5      23 May 2024 06:38  Phos  3.8     05-23  Mg     2.2     05-23    TPro  6.5  /  Alb  3.8  /  TBili  0.4  /  DBili  x   /  AST  28  /  ALT  31  /  AlkPhos  75  05-23    Creatinine Trend: 0.60<--, 0.57<--, 0.71<--  LIVER FUNCTIONS - ( 23 May 2024 06:38 )  Alb: 3.8 g/dL / Pro: 6.5 g/dL / ALK PHOS: 75 U/L / ALT: 31 U/L / AST: 28 U/L / GGT: x           PT/INR - ( 22 May 2024 06:55 )   PT: 10.8 sec;   INR: 0.98 ratio         PTT - ( 22 May 2024 06:55 )  PTT:28.0 sec      Urinalysis Basic - ( 23 May 2024 06:38 )    Color: x / Appearance: x / SG: x / pH: x  Gluc: 105 mg/dL / Ketone: x  / Bili: x / Urobili: x   Blood: x / Protein: x / Nitrite: x   Leuk Esterase: x / RBC: x / WBC x   Sq Epi: x / Non Sq Epi: x / Bacteria: x        CAPILLARY BLOOD GLUCOSE          RADIOLOGY & ADDITIONAL TESTS: Reviewed    Imaging Personally Reviewed:    Consultant(s) Notes Reviewed:      Care Discussed with Consultants/Other Providers:

## 2024-05-23 NOTE — DIETITIAN INITIAL EVALUATION ADULT - REASON INDICATOR FOR ASSESSMENT
Consult for "MST score 2 or >"  Source: chart, patient (answered few questions, initially sleeping, was drowsy and ended up sleeping afterward), RN  Chart reviewed, events noted

## 2024-05-23 NOTE — PROVIDER CONTACT NOTE (OTHER) - ASSESSMENT
Patient is A&Ox0, can follow basic commands at times. VSS.
Pt A&Ox0-1. No s/s of bleeding. Pt denies cp, denies SOB, denies pain. Stat medication ordered before, but provider aware not given. Pt got agitated and combative, so gave the stat dose of haldol and ativan.

## 2024-05-23 NOTE — DIETITIAN INITIAL EVALUATION ADULT - ADD RECOMMEND
1. continue current diet as tolerated of: regular diet  2. encourage PO intake, protein source with each meal as tolerated  3. as medically feasible, continue thiamine, folic acid, multivitamin as ordered by team  4. monitor PO intake, weight trend, electrolytes, blood glucose levels, labs, BMs

## 2024-05-23 NOTE — DISCHARGE NOTE PROVIDER - NSDCCPCAREPLAN_GEN_ALL_CORE_FT
PRINCIPAL DISCHARGE DIAGNOSIS  Diagnosis: Altered mental state  Assessment and Plan of Treatment: You came to the hospital in an altered mental state.  We believe that it was due to alcohol use.  We gave you a vitamin, thiamine, IV.  Your symptoms improved.  You were seen by psychiatry and there was some  concern that you may have Wernicke-Korsakoff syndrome.  It is very important that you quit drinking.  It is also important that you follow up with psychiatry within 1 week of discharge.  Please call a doctor or go to the ER right away if:  - You become confused again  - You begin to drink  - You have seizures  - You go into withdrawal

## 2024-05-23 NOTE — BH CONSULTATION LIAISON PROGRESS NOTE - NSBHCHARTREVIEWLAB_PSY_A_CORE FT
13.6   4.08  )-----------( 232      ( 23 May 2024 06:38 )             40.6     05-23    140  |  105  |  13  ----------------------------<  105<H>  3.9   |  23  |  0.60    Ca    9.5      23 May 2024 06:38  Phos  3.8     05-23  Mg     2.2     05-23    TPro  6.5  /  Alb  3.8  /  TBili  0.4  /  DBili  x   /  AST  28  /  ALT  31  /  AlkPhos  75  05-23

## 2024-05-23 NOTE — BH CONSULTATION LIAISON PROGRESS NOTE - NSBHASSESSMENTFT_PSY_ALL_CORE
Pt is a 57 y/o SWF with hx of alcohol dependence, was bib EMS after pt was found confused in a nearby park. Psychiatry called for confusion and bizarre behaviors. pt seen, AOA x 1, communicates via whispering and spelling her words out, and presenting with nonsensical speech. pt poor historian, responding to internal stimuli, denies past psychiatric history, however pt not reliable historian. Pt did give permission to speak with her son, Asher.   Asher called, states pt has long hx of alcohol dependence, and went on 15 day binge, drinking up to 2 bottles of wine per night, and last 2 days stopped drinking and per son, pt became more confused and "deluded." Son reports staying home to watch her mother to keep from drinking, but he states he fell asleep, to wake up and did not see his mother in the house. Pt was found near a park in [place], passed out. Son states these deluded behaviors have occurred in the past and resolved over time. he denies hx of si/hi, and has never been diagnosed with a psychiatric disorder in the past.

## 2024-05-24 VITALS
RESPIRATION RATE: 18 BRPM | OXYGEN SATURATION: 94 % | TEMPERATURE: 98 F | DIASTOLIC BLOOD PRESSURE: 83 MMHG | SYSTOLIC BLOOD PRESSURE: 154 MMHG | HEART RATE: 79 BPM

## 2024-05-24 LAB
ALBUMIN SERPL ELPH-MCNC: 4.1 G/DL — SIGNIFICANT CHANGE UP (ref 3.3–5)
ALP SERPL-CCNC: 77 U/L — SIGNIFICANT CHANGE UP (ref 40–120)
ALT FLD-CCNC: 32 U/L — SIGNIFICANT CHANGE UP (ref 10–45)
ANION GAP SERPL CALC-SCNC: 16 MMOL/L — SIGNIFICANT CHANGE UP (ref 5–17)
AST SERPL-CCNC: 27 U/L — SIGNIFICANT CHANGE UP (ref 10–40)
BILIRUB SERPL-MCNC: 0.4 MG/DL — SIGNIFICANT CHANGE UP (ref 0.2–1.2)
BUN SERPL-MCNC: 13 MG/DL — SIGNIFICANT CHANGE UP (ref 7–23)
CALCIUM SERPL-MCNC: 10 MG/DL — SIGNIFICANT CHANGE UP (ref 8.4–10.5)
CHLORIDE SERPL-SCNC: 102 MMOL/L — SIGNIFICANT CHANGE UP (ref 96–108)
CO2 SERPL-SCNC: 22 MMOL/L — SIGNIFICANT CHANGE UP (ref 22–31)
CREAT SERPL-MCNC: 0.64 MG/DL — SIGNIFICANT CHANGE UP (ref 0.5–1.3)
EGFR: 103 ML/MIN/1.73M2 — SIGNIFICANT CHANGE UP
GLUCOSE SERPL-MCNC: 76 MG/DL — SIGNIFICANT CHANGE UP (ref 70–99)
HCT VFR BLD CALC: 42.2 % — SIGNIFICANT CHANGE UP (ref 34.5–45)
HGB BLD-MCNC: 13.9 G/DL — SIGNIFICANT CHANGE UP (ref 11.5–15.5)
MAGNESIUM SERPL-MCNC: 2.3 MG/DL — SIGNIFICANT CHANGE UP (ref 1.6–2.6)
MCHC RBC-ENTMCNC: 30.6 PG — SIGNIFICANT CHANGE UP (ref 27–34)
MCHC RBC-ENTMCNC: 32.9 GM/DL — SIGNIFICANT CHANGE UP (ref 32–36)
MCV RBC AUTO: 93 FL — SIGNIFICANT CHANGE UP (ref 80–100)
NRBC # BLD: 0 /100 WBCS — SIGNIFICANT CHANGE UP (ref 0–0)
PHOSPHATE SERPL-MCNC: 3.8 MG/DL — SIGNIFICANT CHANGE UP (ref 2.5–4.5)
PLATELET # BLD AUTO: 259 K/UL — SIGNIFICANT CHANGE UP (ref 150–400)
POTASSIUM SERPL-MCNC: 3.8 MMOL/L — SIGNIFICANT CHANGE UP (ref 3.5–5.3)
POTASSIUM SERPL-SCNC: 3.8 MMOL/L — SIGNIFICANT CHANGE UP (ref 3.5–5.3)
PROT SERPL-MCNC: 7 G/DL — SIGNIFICANT CHANGE UP (ref 6–8.3)
RBC # BLD: 4.54 M/UL — SIGNIFICANT CHANGE UP (ref 3.8–5.2)
RBC # FLD: 12.5 % — SIGNIFICANT CHANGE UP (ref 10.3–14.5)
SODIUM SERPL-SCNC: 140 MMOL/L — SIGNIFICANT CHANGE UP (ref 135–145)
WBC # BLD: 4.49 K/UL — SIGNIFICANT CHANGE UP (ref 3.8–10.5)
WBC # FLD AUTO: 4.49 K/UL — SIGNIFICANT CHANGE UP (ref 3.8–10.5)

## 2024-05-24 PROCEDURE — 85025 COMPLETE CBC W/AUTO DIFF WBC: CPT

## 2024-05-24 PROCEDURE — 86901 BLOOD TYPING SEROLOGIC RH(D): CPT

## 2024-05-24 PROCEDURE — 99239 HOSP IP/OBS DSCHRG MGMT >30: CPT | Mod: GC

## 2024-05-24 PROCEDURE — 82962 GLUCOSE BLOOD TEST: CPT

## 2024-05-24 PROCEDURE — 83735 ASSAY OF MAGNESIUM: CPT

## 2024-05-24 PROCEDURE — 87389 HIV-1 AG W/HIV-1&-2 AB AG IA: CPT

## 2024-05-24 PROCEDURE — 87086 URINE CULTURE/COLONY COUNT: CPT

## 2024-05-24 PROCEDURE — 85027 COMPLETE CBC AUTOMATED: CPT

## 2024-05-24 PROCEDURE — 86850 RBC ANTIBODY SCREEN: CPT

## 2024-05-24 PROCEDURE — 84702 CHORIONIC GONADOTROPIN TEST: CPT

## 2024-05-24 PROCEDURE — 80053 COMPREHEN METABOLIC PANEL: CPT

## 2024-05-24 PROCEDURE — 85730 THROMBOPLASTIN TIME PARTIAL: CPT

## 2024-05-24 PROCEDURE — 87640 STAPH A DNA AMP PROBE: CPT

## 2024-05-24 PROCEDURE — 86900 BLOOD TYPING SEROLOGIC ABO: CPT

## 2024-05-24 PROCEDURE — 81001 URINALYSIS AUTO W/SCOPE: CPT

## 2024-05-24 PROCEDURE — 87637 SARSCOV2&INF A&B&RSV AMP PRB: CPT

## 2024-05-24 PROCEDURE — 70450 CT HEAD/BRAIN W/O DYE: CPT | Mod: MC

## 2024-05-24 PROCEDURE — 99285 EMERGENCY DEPT VISIT HI MDM: CPT

## 2024-05-24 PROCEDURE — 84100 ASSAY OF PHOSPHORUS: CPT

## 2024-05-24 PROCEDURE — 72125 CT NECK SPINE W/O DYE: CPT | Mod: MC

## 2024-05-24 PROCEDURE — 80307 DRUG TEST PRSMV CHEM ANLYZR: CPT

## 2024-05-24 PROCEDURE — 85610 PROTHROMBIN TIME: CPT

## 2024-05-24 PROCEDURE — 96374 THER/PROPH/DIAG INJ IV PUSH: CPT

## 2024-05-24 PROCEDURE — 87641 MR-STAPH DNA AMP PROBE: CPT

## 2024-05-24 PROCEDURE — 84443 ASSAY THYROID STIM HORMONE: CPT

## 2024-05-24 PROCEDURE — 87186 SC STD MICRODIL/AGAR DIL: CPT

## 2024-05-24 RX ORDER — CYCLOSPORINE 0.5 MG/ML
1 EMULSION OPHTHALMIC
Refills: 0 | DISCHARGE

## 2024-05-24 RX ADMIN — Medication 105 MILLIGRAM(S): at 13:28

## 2024-05-24 RX ADMIN — Medication 105 MILLIGRAM(S): at 05:18

## 2024-05-24 RX ADMIN — Medication 1 TABLET(S): at 11:13

## 2024-05-24 RX ADMIN — CHLORHEXIDINE GLUCONATE 1 APPLICATION(S): 213 SOLUTION TOPICAL at 11:13

## 2024-05-24 RX ADMIN — Medication 1 MILLIGRAM(S): at 11:13

## 2024-05-24 RX ADMIN — HEPARIN SODIUM 5000 UNIT(S): 5000 INJECTION INTRAVENOUS; SUBCUTANEOUS at 05:18

## 2024-05-24 NOTE — PROGRESS NOTE ADULT - SUBJECTIVE AND OBJECTIVE BOX
***************************************************************  Barron Simms, PG1  Internal Medicine   Pager:  TEAMS  ***************************************************************    FARHAN SAWYER  58y  MRN: [id number]    Patient is a 58y old  Female who presents with a chief complaint of Altered mental status    Interval/Overnight Events: no events ON.     Subjective: Pt seen and examined at bedside. Denies fever, CP, SOB, abn pain, N/V, dysuria. Tolerating diet.      MEDICATIONS  (STANDING):  chlorhexidine 2% Cloths 1 Application(s) Topical daily  folic acid 1 milliGRAM(s) Oral daily  heparin   Injectable 5000 Unit(s) SubCutaneous every 12 hours  multivitamin 1 Tablet(s) Oral daily  thiamine IVPB 500 milliGRAM(s) IV Intermittent three times a day    MEDICATIONS  (PRN):  haloperidol    Injectable 1 milliGRAM(s) IV Push three times a day PRN agitation  LORazepam     Tablet 2 milliGRAM(s) Oral every 2 hours PRN CIWA-Ar score increase by 2 points and a total score of 7 or less  LORazepam     Tablet 2 milliGRAM(s) Oral every 1 hour PRN CIWA-Ar score 8 or greater  LORazepam   Injectable 0.5 milliGRAM(s) IV Push every 8 hours PRN Agitation      Objective:    Vitals: Vital Signs Last 24 Hrs  T(C): 36.5 (24 @ 04:00), Max: 36.7 (24 @ 08:11)  T(F): 97.7 (24 @ 04:00), Max: 98.1 (24 @ 08:11)  HR: 65 (24 @ 04:00) (57 - 74)  BP: 123/74 (24 @ 04:00) (123/74 - 148/87)  BP(mean): --  RR: 18 (24 @ 04:00) (17 - 18)  SpO2: 97% (24 @ 04:00) (95% - 98%)                I&O's Summary    22 May 2024 07:01  -  23 May 2024 07:00  --------------------------------------------------------  IN: 160 mL / OUT: 820 mL / NET: -660 mL    23 May 2024 07:01  -  24 May 2024 06:25  --------------------------------------------------------  IN: 360 mL / OUT: 0 mL / NET: 360 mL        PHYSICAL EXAM:  Gen: NAD, diaphoretic, altered  HEENT: NCAT, conjunctiva clear, sclera anicteric, no erythema or exudates in the oropharynx, mmm  Neck: Supple  CV: RRR, S1S2, no m/r/g  Resp: CTAB, normal respiratory effort, no w/r/r  Abd: Soft, nontender, nondistended, normal bowel sounds  Ext: no edema, no clubbing or cyanosis  Neuro: AOx3, CN2-12 grossly intact, no focal deficits  SKIN: warm, perfused  PSYCH: Appropriate affect    LABS:                        13.6   4.08  )-----------( 232      ( 23 May 2024 06:38 )             40.6                         13.6   3.91  )-----------( 256      ( 22 May 2024 06:55 )             40.5                         14.0   6.55  )-----------( 286      ( 21 May 2024 14:15 )             40.9     05-23    140  |  105  |  13  ----------------------------<  105<H>  3.9   |  23  |  0.60      141  |  105  |  14  ----------------------------<  84  3.2<L>   |  23  |  0.57      139  |  102  |  21  ----------------------------<  114<H>  3.5   |  22  |  0.71    Ca    9.5      23 May 2024 06:38  Ca    9.1      22 May 2024 06:50  Ca    9.7      21 May 2024 14:15  Phos  3.8       Mg     2.2         TPro  6.5  /  Alb  3.8  /  TBili  0.4  /  DBili  x   /  AST  28  /  ALT  31  /  AlkPhos  75    TPro  6.7  /  Alb  4.2  /  TBili  0.6  /  DBili  x   /  AST  37  /  ALT  35  /  AlkPhos  83    TPro  7.4  /  Alb  4.5  /  TBili  0.4  /  DBili  x   /  AST  42<H>  /  ALT  36  /  AlkPhos  86      CAPILLARY BLOOD GLUCOSE        PT/INR - ( 22 May 2024 06:55 )   PT: 10.8 sec;   INR: 0.98 ratio         PTT - ( 22 May 2024 06:55 )  PTT:28.0 sec    Urinalysis Basic - ( 23 May 2024 18:17 )    Color: Yellow / Appearance: Clear / S.009 / pH: x  Gluc: x / Ketone: Negative mg/dL  / Bili: Negative / Urobili: 0.2 mg/dL   Blood: x / Protein: Negative mg/dL / Nitrite: Negative   Leuk Esterase: Trace / RBC: 1 /HPF / WBC 5 /HPF   Sq Epi: x / Non Sq Epi: 0 /HPF / Bacteria: Negative /HPF          RADIOLOGY & ADDITIONAL TESTS:    Imaging Personally Reviewed:  [x ] YES  [ ] NO    Consultants involved in case:   Consultant(s) Notes Reviewed:  [ x] YES  [ ] NO:   Care Discussed with Consultants/Other Providers [x ] YES  [ ] NO         ***************************************************************  Barron Simms, PG1  Internal Medicine   Pager:  TEAMS  ***************************************************************    FARHAN SAWYER  58y  MRN: [id number]    Patient is a 58y old  Female who presents with a chief complaint of Altered mental status    Interval/Overnight Events: no events ON.     Subjective: Pt seen and examined at bedside. Much more responsive and appropriate today.  No complaints    MEDICATIONS  (STANDING):  chlorhexidine 2% Cloths 1 Application(s) Topical daily  folic acid 1 milliGRAM(s) Oral daily  heparin   Injectable 5000 Unit(s) SubCutaneous every 12 hours  multivitamin 1 Tablet(s) Oral daily  thiamine IVPB 500 milliGRAM(s) IV Intermittent three times a day    MEDICATIONS  (PRN):  haloperidol    Injectable 1 milliGRAM(s) IV Push three times a day PRN agitation  LORazepam     Tablet 2 milliGRAM(s) Oral every 2 hours PRN CIWA-Ar score increase by 2 points and a total score of 7 or less  LORazepam     Tablet 2 milliGRAM(s) Oral every 1 hour PRN CIWA-Ar score 8 or greater  LORazepam   Injectable 0.5 milliGRAM(s) IV Push every 8 hours PRN Agitation      Objective:    Vitals: Vital Signs Last 24 Hrs  T(C): 36.5 (24 @ 04:00), Max: 36.7 (24 @ 08:11)  T(F): 97.7 (24 @ 04:00), Max: 98.1 (24 @ 08:11)  HR: 65 (24 @ 04:00) (57 - 74)  BP: 123/74 (24 @ 04:00) (123/74 - 148/87)  BP(mean): --  RR: 18 (24 @ 04:00) (17 - 18)  SpO2: 97% (24 @ 04:00) (95% - 98%)                I&O's Summary    22 May 2024 07:01  -  23 May 2024 07:00  --------------------------------------------------------  IN: 160 mL / OUT: 820 mL / NET: -660 mL    23 May 2024 07:01  -  24 May 2024 06:25  --------------------------------------------------------  IN: 360 mL / OUT: 0 mL / NET: 360 mL        PHYSICAL EXAM:  Gen: NAD, diaphoretic, altered  HEENT: NCAT, conjunctiva clear, sclera anicteric, no erythema or exudates in the oropharynx, mmm  Neck: Supple  CV: RRR, S1S2, no m/r/g  Resp: CTAB, normal respiratory effort, no w/r/r  Abd: Soft, nontender, nondistended, normal bowel sounds  Ext: no edema, no clubbing or cyanosis  Neuro: AOx3, CN2-12 grossly intact, no focal deficits  SKIN: warm, perfused  PSYCH: Appropriate affect    LABS:                        13.6   4.08  )-----------( 232      ( 23 May 2024 06:38 )             40.6                         13.6   3.91  )-----------( 256      ( 22 May 2024 06:55 )             40.5                         14.0   6.55  )-----------( 286      ( 21 May 2024 14:15 )             40.9         140  |  105  |  13  ----------------------------<  105<H>  3.9   |  23  |  0.60      141  |  105  |  14  ----------------------------<  84  3.2<L>   |  23  |  0.57      139  |  102  |  21  ----------------------------<  114<H>  3.5   |  22  |  0.71    Ca    9.5      23 May 2024 06:38  Ca    9.1      22 May 2024 06:50  Ca    9.7      21 May 2024 14:15  Phos  3.8       Mg     2.2         TPro  6.5  /  Alb  3.8  /  TBili  0.4  /  DBili  x   /  AST  28  /  ALT  31  /  AlkPhos  75    TPro  6.7  /  Alb  4.2  /  TBili  0.6  /  DBili  x   /  AST  37  /  ALT  35  /  AlkPhos  83    TPro  7.4  /  Alb  4.5  /  TBili  0.4  /  DBili  x   /  AST  42<H>  /  ALT  36  /  AlkPhos  86      CAPILLARY BLOOD GLUCOSE        PT/INR - ( 22 May 2024 06:55 )   PT: 10.8 sec;   INR: 0.98 ratio         PTT - ( 22 May 2024 06:55 )  PTT:28.0 sec    Urinalysis Basic - ( 23 May 2024 18:17 )    Color: Yellow / Appearance: Clear / S.009 / pH: x  Gluc: x / Ketone: Negative mg/dL  / Bili: Negative / Urobili: 0.2 mg/dL   Blood: x / Protein: Negative mg/dL / Nitrite: Negative   Leuk Esterase: Trace / RBC: 1 /HPF / WBC 5 /HPF   Sq Epi: x / Non Sq Epi: 0 /HPF / Bacteria: Negative /HPF          RADIOLOGY & ADDITIONAL TESTS:    Imaging Personally Reviewed:  [x ] YES  [ ] NO    Consultants involved in case:   Consultant(s) Notes Reviewed:  [ x] YES  [ ] NO:   Care Discussed with Consultants/Other Providers [x ] YES  [ ] NO

## 2024-05-24 NOTE — PROGRESS NOTE ADULT - PROBLEM SELECTOR PLAN 2
Patient retaining urine, on straight cath removed over 800cc.  Possibly opioid related (although may be false positive on urine tox).  UA reassuring  - Bladder scans  - Straight cath as needed Patient retaining urine, on straight cath removed over 800cc.  Possibly opioid related (although may be false positive on urine tox).  UA reassuring.  RESOLVE

## 2024-05-24 NOTE — DISCHARGE NOTE NURSING/CASE MANAGEMENT/SOCIAL WORK - NSDCFUADDAPPT_GEN_ALL_CORE_FT
APPTS ARE READY TO BE MADE: [X] YES    Best Family or Patient Contact (if needed):    Additional Information about above appointments (if needed):    1: Primary care appointment with Dr Barron Simms if possible at [address] [place] [address]  2:   3:     Other comments or requests:

## 2024-05-24 NOTE — PROGRESS NOTE ADULT - PROBLEM SELECTOR PLAN 3
- Pt and son unable to provide medication list  - Son also mentions pt had a severe allergy to an anesthetic in the past, but doest know what. Completed med rec

## 2024-05-24 NOTE — PROGRESS NOTE ADULT - ASSESSMENT
58F with PMH R breast cancer (s/p double mastectomy (2017), RT) lumbar stenosis s/p L2-3 fusion, GERD, and glaucoma brought in after being found down outside with reported visual/auditory hallucinations in setting of recent binge drinking. Found to have + opiates in urine. Admitted for encephalopathy evaluation and this morning found to have diaphoresis and a tremor in her hands. 58F with PMH R breast cancer (s/p double mastectomy (2017), RT) lumbar stenosis s/p L2-3 fusion, GERD, and glaucoma brought in after being found down outside with reported visual/auditory hallucinations in setting of recent binge drinking. Found to have + opiates in urine. Admitted for encephalopathy evaluation

## 2024-05-24 NOTE — PROGRESS NOTE ADULT - ATTENDING COMMENTS
58F with PMH R breast cancer (s/p double mastectomy (2017), RT) lumbar stenosis s/p L2-3 fusion, GERD, and glaucoma presenting after being found down outside with altered mental status,     # Acute toxic metabolic encephalopathy - Pt with improved delriium this morning- is AO3. Unclear if pt possibly had a psychotic break leading up to thie episode. Psych consult appreciated. Monitor CIWA and ativan as needed.    # Alcohol abuse - recent binge drinking and alcohol abuse. Will monitor for alcohol withdrawal symptoms. CIWA, symptom triggered ativan. Ordered MV, folate, thiamine.
58F with PMH R breast cancer (s/p double mastectomy (2017), RT) lumbar stenosis s/p L2-3 fusion, GERD, and glaucoma presenting after being found down outside with altered mental status,     # Acute toxic metabolic encephalopathy - Pt at baseline today. Low CIWA- no use of PRN meds  Pt with improved delriium this morning- is AO3. Unclear if pt possibly had a psychotic break leading up to thie episode. Psych consult appreciated.   To finish thiamine today  seen by psych with no CI to dc  follow up at [facility] for discharge  discharge time 35 min
58F with PMH R breast cancer (s/p double mastectomy (2017), RT) lumbar stenosis s/p L2-3 fusion, GERD, and glaucoma presenting after being found down outside with altered mental status,     # Acute toxic metabolic encephalopathy - Pt with delriium this morning- is AO3 but then answers other questions nonsensicallly. Unclear if pt possibly had a psychotic break leading up to thie episode. Psych consult called. Monitor CIWA and ativan as needed.    # Alcohol abuse - recent binge drinking and alcohol abuse. Will monitor for alcohol withdrawal symptoms. Start CIWA, symptom triggered ativan. Ordered MV, folate, thiamine.     Rest of plan as outline above. Discussed case with resident and student

## 2024-05-24 NOTE — PROGRESS NOTE ADULT - PROBLEM SELECTOR PLAN 4
Diet: Regular  - Aspiration precautions  DVT Proph: heparin sub q  Dispo: Pending course.

## 2024-05-24 NOTE — DISCHARGE NOTE NURSING/CASE MANAGEMENT/SOCIAL WORK - NSDCPEFALRISK_GEN_ALL_CORE
For information on Fall & Injury Prevention, visit: [email or web address] OR  [email or web address] OR  https://www.cdc.gov/steadi/patient.html

## 2024-05-24 NOTE — PROGRESS NOTE ADULT - PROBLEM SELECTOR PLAN 1
Unclear etiology of acute encephalopathy, though likely related to drug intoxication. Preceding events may have been manic episode vs intoxication vs mixed.  CT head unremarkable.  uTOX + for opiates but negative for oxycodone, possibly secondary to loperamide.  TSH wnl.  Appears to be waxing/waning w/inattentiveness at times.     - Check HIV neg,  - Given binge drinking, will place on symptom triggered CIWA  - Psych consulted, appreciate recs  - Continue thiamine 500mg for txt per BH  -PRN haldol and ativan per BH Unclear etiology of acute encephalopathy, though likely related to drug intoxication. Preceding events may have been manic episode vs intoxication vs mixed.  CT head unremarkable.  uTOX + for opiates but negative for oxycodone, possibly secondary to loperamide.  TSH wnl.  Appears resolved.     - Check HIV neg,  - Given binge drinking, will place on symptom triggered CIWA  - Psych consulted, appreciate recs  - Continue thiamine 500mg for txt per   -PRN haldol and ativan per   - Stable for discharge  - Follow up with Psychiatry at [facility]

## 2024-05-24 NOTE — DISCHARGE NOTE NURSING/CASE MANAGEMENT/SOCIAL WORK - PATIENT PORTAL LINK FT
You can access the FollowMyHealth Patient Portal offered by [facility] by registering at the following website: [email or web address]. By joining [employer]’s FollowMyHealth portal, you will also be able to view your health information using other applications (apps) compatible with our system.

## 2024-05-27 LAB
-  AMOXICILLIN/CLAVULANIC ACID: SIGNIFICANT CHANGE UP
-  AMPICILLIN/SULBACTAM: SIGNIFICANT CHANGE UP
-  AMPICILLIN: SIGNIFICANT CHANGE UP
-  AZTREONAM: SIGNIFICANT CHANGE UP
-  CEFAZOLIN: SIGNIFICANT CHANGE UP
-  CEFEPIME: SIGNIFICANT CHANGE UP
-  CEFOXITIN: SIGNIFICANT CHANGE UP
-  CEFTRIAXONE: SIGNIFICANT CHANGE UP
-  CEFUROXIME: SIGNIFICANT CHANGE UP
-  CIPROFLOXACIN: SIGNIFICANT CHANGE UP
-  ERTAPENEM: SIGNIFICANT CHANGE UP
-  GENTAMICIN: SIGNIFICANT CHANGE UP
-  IMIPENEM: SIGNIFICANT CHANGE UP
-  LEVOFLOXACIN: SIGNIFICANT CHANGE UP
-  MEROPENEM: SIGNIFICANT CHANGE UP
-  NITROFURANTOIN: SIGNIFICANT CHANGE UP
-  PIPERACILLIN/TAZOBACTAM: SIGNIFICANT CHANGE UP
-  TOBRAMYCIN: SIGNIFICANT CHANGE UP
-  TRIMETHOPRIM/SULFAMETHOXAZOLE: SIGNIFICANT CHANGE UP
CULTURE RESULTS: ABNORMAL
METHOD TYPE: SIGNIFICANT CHANGE UP
ORGANISM # SPEC MICROSCOPIC CNT: ABNORMAL
ORGANISM # SPEC MICROSCOPIC CNT: ABNORMAL
SPECIMEN SOURCE: SIGNIFICANT CHANGE UP